# Patient Record
Sex: FEMALE | Race: OTHER | HISPANIC OR LATINO | Employment: FULL TIME | URBAN - METROPOLITAN AREA
[De-identification: names, ages, dates, MRNs, and addresses within clinical notes are randomized per-mention and may not be internally consistent; named-entity substitution may affect disease eponyms.]

---

## 2019-02-06 ENCOUNTER — HOSPITAL ENCOUNTER (EMERGENCY)
Facility: HOSPITAL | Age: 34
Discharge: HOME | End: 2019-02-07
Attending: EMERGENCY MEDICINE | Admitting: EMERGENCY MEDICINE

## 2019-02-06 DIAGNOSIS — K80.50 BILIARY COLIC: Primary | ICD-10-CM

## 2019-02-06 DIAGNOSIS — N39.0 UTI (URINARY TRACT INFECTION): ICD-10-CM

## 2019-02-06 PROCEDURE — 99284 EMERGENCY DEPT VISIT MOD MDM: CPT

## 2019-02-07 ENCOUNTER — APPOINTMENT (EMERGENCY)
Dept: CT IMAGING | Facility: HOSPITAL | Age: 34
End: 2019-02-07

## 2019-02-07 ENCOUNTER — APPOINTMENT (EMERGENCY)
Dept: ULTRASOUND IMAGING | Facility: HOSPITAL | Age: 34
End: 2019-02-07

## 2019-02-07 VITALS
HEART RATE: 84 BPM | OXYGEN SATURATION: 100 % | DIASTOLIC BLOOD PRESSURE: 79 MMHG | RESPIRATION RATE: 16 BRPM | SYSTOLIC BLOOD PRESSURE: 122 MMHG | HEIGHT: 67 IN | TEMPERATURE: 97.2 F | BODY MASS INDEX: 29.86 KG/M2 | WEIGHT: 190.26 LBS

## 2019-02-07 LAB
ALBUMIN SERPL BCP-MCNC: 4.4 G/DL (ref 3–5.2)
ALP SERPL-CCNC: 83 U/L (ref 43–122)
ALT SERPL W P-5'-P-CCNC: 26 U/L (ref 9–52)
AMPHETAMINES SERPL QL SCN: NEGATIVE
ANION GAP SERPL CALCULATED.3IONS-SCNC: 10 MMOL/L (ref 5–14)
AST SERPL W P-5'-P-CCNC: 43 U/L (ref 14–36)
BACTERIA UR QL AUTO: ABNORMAL /HPF
BARBITURATES UR QL: NEGATIVE
BASOPHILS # BLD AUTO: 0.1 THOUSANDS/ΜL (ref 0–0.1)
BASOPHILS NFR BLD AUTO: 1 % (ref 0–1)
BENZODIAZ UR QL: NEGATIVE
BILIRUB SERPL-MCNC: 0.8 MG/DL
BILIRUB UR QL STRIP: NEGATIVE
BUN SERPL-MCNC: 11 MG/DL (ref 5–25)
CALCIUM SERPL-MCNC: 9.3 MG/DL (ref 8.4–10.2)
CHLORIDE SERPL-SCNC: 106 MMOL/L (ref 97–108)
CLARITY UR: ABNORMAL
CO2 SERPL-SCNC: 23 MMOL/L (ref 22–30)
COCAINE UR QL: NEGATIVE
COLOR UR: YELLOW
CREAT SERPL-MCNC: 0.57 MG/DL (ref 0.6–1.2)
EOSINOPHIL # BLD AUTO: 0 THOUSAND/ΜL (ref 0–0.4)
EOSINOPHIL NFR BLD AUTO: 0 % (ref 0–6)
ERYTHROCYTE [DISTWIDTH] IN BLOOD BY AUTOMATED COUNT: 14.4 %
EXT PREG TEST URINE: NEGATIVE
GFR SERPL CREATININE-BSD FRML MDRD: 123 ML/MIN/1.73SQ M
GLUCOSE SERPL-MCNC: 101 MG/DL (ref 70–99)
GLUCOSE UR STRIP-MCNC: NEGATIVE MG/DL
HCT VFR BLD AUTO: 45.4 % (ref 36–46)
HGB BLD-MCNC: 14.5 G/DL (ref 12–16)
HGB UR QL STRIP.AUTO: NEGATIVE
KETONES UR STRIP-MCNC: NEGATIVE MG/DL
LEUKOCYTE ESTERASE UR QL STRIP: 25
LIPASE SERPL-CCNC: 32 U/L (ref 23–300)
LYMPHOCYTES # BLD AUTO: 1 THOUSANDS/ΜL (ref 0.5–4)
LYMPHOCYTES NFR BLD AUTO: 9 % (ref 25–45)
MCH RBC QN AUTO: 30.2 PG (ref 26–34)
MCHC RBC AUTO-ENTMCNC: 32 G/DL (ref 31–36)
MCV RBC AUTO: 94 FL (ref 80–100)
METHADONE UR QL: NEGATIVE
MONOCYTES # BLD AUTO: 0.3 THOUSAND/ΜL (ref 0.2–0.9)
MONOCYTES NFR BLD AUTO: 3 % (ref 1–10)
NEUTROPHILS # BLD AUTO: 9 THOUSANDS/ΜL (ref 1.8–7.8)
NEUTS SEG NFR BLD AUTO: 86 % (ref 45–65)
NITRITE UR QL STRIP: POSITIVE
NON-SQ EPI CELLS URNS QL MICRO: ABNORMAL /HPF
OPIATES UR QL SCN: NEGATIVE
PCP UR QL: NEGATIVE
PH UR STRIP.AUTO: 6.5 [PH] (ref 4.5–8)
PLATELET # BLD AUTO: 307 THOUSANDS/UL (ref 150–450)
PMV BLD AUTO: 9.1 FL (ref 8.9–12.7)
POTASSIUM SERPL-SCNC: 4.1 MMOL/L (ref 3.6–5)
PROT SERPL-MCNC: 7.8 G/DL (ref 5.9–8.4)
PROT UR STRIP-MCNC: NEGATIVE MG/DL
RBC # BLD AUTO: 4.81 MILLION/UL (ref 4–5.2)
RBC #/AREA URNS AUTO: ABNORMAL /HPF
SODIUM SERPL-SCNC: 139 MMOL/L (ref 137–147)
SP GR UR STRIP.AUTO: 1.02 (ref 1–1.04)
THC UR QL: NEGATIVE
UROBILINOGEN UA: NEGATIVE MG/DL
WBC # BLD AUTO: 10.4 THOUSAND/UL (ref 4.5–11)
WBC #/AREA URNS AUTO: ABNORMAL /HPF

## 2019-02-07 PROCEDURE — 80307 DRUG TEST PRSMV CHEM ANLYZR: CPT | Performed by: EMERGENCY MEDICINE

## 2019-02-07 PROCEDURE — 80053 COMPREHEN METABOLIC PANEL: CPT | Performed by: EMERGENCY MEDICINE

## 2019-02-07 PROCEDURE — 76705 ECHO EXAM OF ABDOMEN: CPT

## 2019-02-07 PROCEDURE — 96374 THER/PROPH/DIAG INJ IV PUSH: CPT

## 2019-02-07 PROCEDURE — 96372 THER/PROPH/DIAG INJ SC/IM: CPT

## 2019-02-07 PROCEDURE — 83690 ASSAY OF LIPASE: CPT | Performed by: EMERGENCY MEDICINE

## 2019-02-07 PROCEDURE — 36415 COLL VENOUS BLD VENIPUNCTURE: CPT | Performed by: EMERGENCY MEDICINE

## 2019-02-07 PROCEDURE — 81025 URINE PREGNANCY TEST: CPT | Performed by: EMERGENCY MEDICINE

## 2019-02-07 PROCEDURE — 85025 COMPLETE CBC W/AUTO DIFF WBC: CPT | Performed by: EMERGENCY MEDICINE

## 2019-02-07 PROCEDURE — 81001 URINALYSIS AUTO W/SCOPE: CPT | Performed by: EMERGENCY MEDICINE

## 2019-02-07 PROCEDURE — 74176 CT ABD & PELVIS W/O CONTRAST: CPT

## 2019-02-07 RX ORDER — MORPHINE SULFATE 4 MG/ML
4 INJECTION, SOLUTION INTRAMUSCULAR; INTRAVENOUS ONCE
Status: COMPLETED | OUTPATIENT
Start: 2019-02-07 | End: 2019-02-07

## 2019-02-07 RX ORDER — HYDROMORPHONE HCL/PF 1 MG/ML
SYRINGE (ML) INJECTION
Status: COMPLETED
Start: 2019-02-07 | End: 2019-02-07

## 2019-02-07 RX ORDER — CEPHALEXIN 500 MG/1
500 CAPSULE ORAL EVERY 12 HOURS SCHEDULED
Qty: 1 CAPSULE | Refills: 0 | Status: SHIPPED | OUTPATIENT
Start: 2019-02-07 | End: 2019-02-14

## 2019-02-07 RX ORDER — CEPHALEXIN 500 MG/1
500 CAPSULE ORAL ONCE
Status: COMPLETED | OUTPATIENT
Start: 2019-02-07 | End: 2019-02-07

## 2019-02-07 RX ORDER — ONDANSETRON 4 MG/1
4 TABLET, ORALLY DISINTEGRATING ORAL ONCE
Status: COMPLETED | OUTPATIENT
Start: 2019-02-07 | End: 2019-02-07

## 2019-02-07 RX ORDER — OXYCODONE HYDROCHLORIDE AND ACETAMINOPHEN 5; 325 MG/1; MG/1
1 TABLET ORAL EVERY 4 HOURS PRN
Qty: 10 TABLET | Refills: 0 | Status: SHIPPED | OUTPATIENT
Start: 2019-02-07 | End: 2019-02-17

## 2019-02-07 RX ORDER — HYDROMORPHONE HCL/PF 1 MG/ML
0.5 SYRINGE (ML) INJECTION ONCE
Status: COMPLETED | OUTPATIENT
Start: 2019-02-07 | End: 2019-02-07

## 2019-02-07 RX ORDER — ONDANSETRON 4 MG/1
TABLET, ORALLY DISINTEGRATING ORAL
Status: DISCONTINUED
Start: 2019-02-07 | End: 2019-02-07 | Stop reason: HOSPADM

## 2019-02-07 RX ORDER — HYDROMORPHONE HCL/PF 1 MG/ML
1 SYRINGE (ML) INJECTION ONCE
Status: COMPLETED | OUTPATIENT
Start: 2019-02-07 | End: 2019-02-07

## 2019-02-07 RX ORDER — ONDANSETRON 2 MG/ML
4 INJECTION INTRAMUSCULAR; INTRAVENOUS ONCE
Status: DISCONTINUED | OUTPATIENT
Start: 2019-02-07 | End: 2019-02-07 | Stop reason: HOSPADM

## 2019-02-07 RX ADMIN — MORPHINE SULFATE 4 MG: 4 INJECTION INTRAVENOUS at 01:00

## 2019-02-07 RX ADMIN — HYDROMORPHONE HYDROCHLORIDE 1 MG: 1 INJECTION, SOLUTION INTRAMUSCULAR; INTRAVENOUS; SUBCUTANEOUS at 02:44

## 2019-02-07 RX ADMIN — ONDANSETRON 4 MG: 4 TABLET, ORALLY DISINTEGRATING ORAL at 00:59

## 2019-02-07 RX ADMIN — Medication 1 MG: at 02:44

## 2019-02-07 RX ADMIN — HYDROMORPHONE HYDROCHLORIDE 0.5 MG: 1 INJECTION, SOLUTION INTRAMUSCULAR; INTRAVENOUS; SUBCUTANEOUS at 05:10

## 2019-02-07 RX ADMIN — CEPHALEXIN 500 MG: 500 CAPSULE ORAL at 07:20

## 2019-02-07 NOTE — DISCHARGE INSTRUCTIONS
Biliary Colic   WHAT YOU NEED TO KNOW:   Biliary colic is severe pain in your upper abdomen caused by a gallbladder problem  Your gallbladder stores bile, which helps break down the fats that you eat  DISCHARGE INSTRUCTIONS:   Medicines:   · Medicines  can help decrease pain and muscle spasms  You may also need medicine to calm your stomach and stop vomiting  · Take your medicine as directed  Contact your healthcare provider if you think your medicine is not helping or you have side effects  Tell him if you are allergic to any medicine  Keep a list of the medicines, vitamins, and herbs you take  Include the amounts, and when and why you take them  Bring the list or the pill bottles to follow-up visits  Carry your medicine list with you in case of an emergency  Avoid alcohol:  Alcohol can damage your gallbladder and make your symptoms worse  Maintain a healthy weight:  Ask your healthcare provider how much you should weigh  Ask him to help you create a weight loss plan if you are overweight  Eat a variety of healthy foods:  Healthy foods include fruits, vegetables, whole-grain breads, low-fat dairy products, beans, lean meats, and fish  Foods that are high in fiber and low in fat and cholesterol may decrease your symptoms  Ask if you need to be on a special diet  Exercise:  Ask your healthcare provider about the best exercise plan for you  Exercise may help improve your symptoms  Follow up with your healthcare provider as directed:  Bring a list of any questions you have so you remember to ask them during your visits  Contact your healthcare provider if:   · You have a fever  · Your pain gets worse, even after you take medicine  · You have nausea or are vomiting  · Your skin or eyes are yellow  · You have questions or concerns about your condition or care  Return to the emergency department if:   · You have severe pain  · You feel like you are going to faint      · You are short of breath  © 2017 [facility] Information is for End User's use only and may not be sold, redistributed or otherwise used for commercial purposes  All illustrations and images included in CareNotes® are the copyrighted property of A [employer] M [employer]  or Charli Raymundo  The above information is an  only  It is not intended as medical advice for individual conditions or treatments  Talk to your doctor, nurse or pharmacist before following any medical regimen to see if it is safe and effective for you  Urinary Tract Infection in Women   AMBULATORY CARE:   A urinary tract infection (UTI)  is caused by bacteria that get inside your urinary tract  Most bacteria that enter your urinary tract come out when you urinate  If the bacteria stay in your urinary tract, you may get an infection  Your urinary tract includes your kidneys, ureters, bladder, and urethra  Urine is made in your kidneys, and it flows from the ureters to the bladder  Urine leaves the bladder through the urethra  A UTI is more common in your lower urinary tract, which includes your bladder and urethra  Common symptoms include the following:   · Urinating more often or waking from sleep to urinate    · Pain or burning when you urinate    · Pain or pressure in your lower abdomen     · Urine that smells bad    · Blood in your urine    · Leaking urine  Seek care immediately if:   · You are urinating very little or not at all  · You have a high fever with shaking chills  · You have side or back pain that gets worse  Contact your healthcare provider if:   · You have a fever  · You do not feel better after 2 days of taking antibiotics  · You are vomiting  · You have questions or concerns about your condition or care  Treatment for a UTI  may include medicines to treat a bacterial infection  You may also need medicines to decrease pain and burning, or decrease the urge to urinate often    Prevent a UTI:   · Empty your bladder often  Urinate and empty your bladder as soon as you feel the need  Do not hold your urine for long periods of time  · Wipe from front to back after you urinate or have a bowel movement  This will help prevent germs from getting into your urinary tract through your urethra  · Drink liquids as directed  Ask how much liquid to drink each day and which liquids are best for you  You may need to drink more liquids than usual to help flush out the bacteria  Do not drink alcohol, caffeine, or citrus juices  These can irritate your bladder and increase your symptoms  Your healthcare provider may recommend cranberry juice to help prevent a UTI  · Urinate after you have sex  This can help flush out bacteria passed during sex  · Do not douche or use feminine deodorants  These can change the chemical balance in your vagina  · Change sanitary pads or tampons often  This will help prevent germs from getting into your urinary tract  · Do pelvic muscle exercises often  Pelvic muscle exercises may help you start and stop urinating  Strong pelvic muscles may help you empty your bladder easier  Squeeze these muscles tightly for 5 seconds like you are trying to hold back urine  Then relax for 5 seconds  Gradually work up to squeezing for 10 seconds  Do 3 sets of 15 repetitions a day, or as directed  Follow up with your healthcare provider as directed:  Write down your questions so you remember to ask them during your visits  © 2017 [address] [facility] [facility] Information is for End User's use only and may not be sold, redistributed or otherwise used for commercial purposes  All illustrations and images included in CareNotes® are the copyrighted property of A D A M [employer] Inc  or Charli Raymundo  The above information is an  only  It is not intended as medical advice for individual conditions or treatments   Talk to your doctor, nurse or pharmacist before following any medical regimen to see if it is safe and effective for you

## 2019-02-07 NOTE — ED RE-EVALUATION NOTE
Up ambulating  Ultrasound has been ordered    Signed out to the day shift physician     Lázaro Panchal DO  02/07/19 0957

## 2019-02-07 NOTE — ED PROVIDER NOTES
History  Chief Complaint   Patient presents with    Abdominal Pain     "I have abdominal pain that started tonight at 1700 hours "  Patient did not take anything for the pain  This is a pleasant 29-year-old female that presents with upper abdominal pain  She states that pain began around 5:00 p m , and has been present intermittently since  Patient states that she vomited twice but does not report nausea  She took 1 Tums with minimal relief  She denies any lower abdominal pain  She states that her last menstrual period was January 27th  She denies any vaginal bleeding or discharge  Her only medication is albuterol  She reports no chest pain or shortness of breath  Patient does not smoke, drink alcohol or do illicit drugs  None       No past medical history on file  No past surgical history on file  No family history on file  I have reviewed and agree with the history as documented  Social History   Substance Use Topics    Smoking status: Not on file    Smokeless tobacco: Not on file    Alcohol use Not on file        Review of Systems   Constitutional: Negative  HENT: Negative  Eyes: Negative  Respiratory: Negative  Cardiovascular: Negative  Gastrointestinal: Positive for abdominal distention, abdominal pain and vomiting  Negative for constipation, diarrhea and nausea  Endocrine: Negative  Genitourinary: Negative  Negative for difficulty urinating, pelvic pain, urgency, vaginal bleeding, vaginal discharge and vaginal pain  Musculoskeletal: Negative  Skin: Negative  Allergic/Immunologic: Negative  Neurological: Negative  Hematological: Negative  Psychiatric/Behavioral: Negative  All other systems reviewed and are negative  Physical Exam  Physical Exam   Constitutional: She is oriented to person, place, and time  Vital signs are normal  She appears well-developed and well-nourished  She is active  Non-toxic appearance   She does not have a sickly appearance  She does not appear ill  No distress  HENT:   Head: Normocephalic and atraumatic  Eyes: Pupils are equal, round, and reactive to light  EOM and lids are normal    Neck: Trachea normal and normal range of motion  Normal carotid pulses present  Carotid bruit is not present  Cardiovascular: Normal rate, regular rhythm, normal heart sounds and intact distal pulses  Exam reveals no gallop and no friction rub  No murmur heard  Pulmonary/Chest: Effort normal and breath sounds normal  No respiratory distress  Abdominal: Soft  Normal appearance and bowel sounds are normal  She exhibits no ascites  There is no hepatosplenomegaly  There is tenderness in the epigastric area  There is no rigidity, no rebound, no guarding, no CVA tenderness, no tenderness at McBurney's point and negative Helton's sign  Musculoskeletal: Normal range of motion  She exhibits no edema, tenderness or deformity  Neurological: She is alert and oriented to person, place, and time  Skin: Skin is warm, dry and intact  She is not diaphoretic  No pallor  Psychiatric: She has a normal mood and affect  Her behavior is normal  Judgment and thought content normal  Her mood appears not anxious  Her affect is not blunt  Vitals reviewed        Vital Signs  ED Triage Vitals [02/06/19 2353]   Temperature Pulse Respirations Blood Pressure SpO2   (!) 97 2 °F (36 2 °C) 86 18 126/83 98 %      Temp Source Heart Rate Source Patient Position - Orthostatic VS BP Location FiO2 (%)   Tympanic Monitor Sitting Left arm --      Pain Score       Worst Possible Pain           Vitals:    02/06/19 2353   BP: 126/83   Pulse: 86   Patient Position - Orthostatic VS: Sitting       Visual Acuity      ED Medications  Medications - No data to display    Diagnostic Studies  Results Reviewed     Procedure Component Value Units Date/Time    CBC and differential [[id number]]     Lab Status:  No result Specimen:  Blood     Comprehensive metabolic panel [[id number]]     Lab Status:  No result Specimen:  Blood     Lipase [[id number]]     Lab Status:  No result Specimen:  Blood     POCT pregnancy, urine [[id number]]     Lab Status:  No result Specimen:  Urine     UA w Reflex to Microscopic [[id number]]     Lab Status:  No result Specimen:  Urine                  No orders to display              Procedures  Procedures       Phone Contacts  ED Phone Contact    ED Course                               MDM    Disposition  Final diagnoses:   None     ED Disposition     None      Follow-up Information    None         Patient's Medications    No medications on file     No discharge procedures on file      ED Provider  Electronically Signed by           Brandyn Schultz DO  02/07/19 Chandrakant Zepeda DO  02/07/19 4464

## 2019-02-07 NOTE — ED CARE HANDOFF
Emergency Department Sign Out Note        Sign out and transfer of care from Dr Jesus Weiss  See Separate Emergency Department note  The patient, Alla Mercado, was evaluated by the previous provider for abdominal pain  Workup Completed:  PE, CT, labs    ED Course / Workup Pending (followup): U/s      Received sign-out from Dr Jesus Weiss  Patient presents complaint of abdominal pain  At this point time an ultrasound of her right upper quadrant still pending  Laboratory evaluation was unremarkable and patient will likely be discharged home  Ultrasound is resulted and shows gallstones but no sonographic signs of acute cholecystitis  Plan discharge home the instructions provided by Dr Jesus Weiss  She is aware of the importance of very close outpatient follow-up  Procedures  MDM    Disposition  Final diagnoses:   Biliary colic   UTI (urinary tract infection)     Time reflects when diagnosis was documented in both MDM as applicable and the Disposition within this note     Time User Action Codes Description Comment    2/7/2019  4:06 AM Henny Shah Add [Y90 88] Biliary colic     2/7/4855  8:80 AM Henny Shah Add [N39 0] UTI (urinary tract infection)       ED Disposition     ED Disposition Condition Date/Time Comment    Discharge  Thu Feb 7, 2019  9:21 AM Alla Mercado discharge to home/self care      Condition at discharge: Good        Follow-up Information     Follow up With Specialties Details Why Contact Info    [address] [facility]   If symptoms worsen [address]  [phone]    [address] [facility]  Schedule an appointment as soon as possible for a visit in 1 day If symptoms worsen [address]  [phone]        Patient's Medications   Discharge Prescriptions    CEPHALEXIN (KEFLEX) 500 MG CAPSULE Take 1 capsule (500 mg total) by mouth every 12 (twelve) hours for 14 doses       Start Date: 2/7/2019  End Date: 2/14/2019       Order Dose: 500 mg       Quantity: 1 capsule    Refills: 0     No discharge procedures on file         ED Provider  Electronically Signed by     Gillian Cabrera DO  02/07/19 5055

## 2019-02-07 NOTE — ED RE-EVALUATION NOTE
Discuss lab work and CT scan findings  Patient still has right upper quadrant abdominal pain  Negative Helton sign  Patient willing to wait for ultrasonography       [address],   02/07/19 8198

## 2019-11-15 ENCOUNTER — HOSPITAL ENCOUNTER (EMERGENCY)
Facility: HOSPITAL | Age: 34
Discharge: HOME | End: 2019-11-15
Attending: EMERGENCY MEDICINE

## 2019-11-15 VITALS
RESPIRATION RATE: 20 BRPM | BODY MASS INDEX: 31.04 KG/M2 | SYSTOLIC BLOOD PRESSURE: 107 MMHG | WEIGHT: 198.19 LBS | DIASTOLIC BLOOD PRESSURE: 71 MMHG | TEMPERATURE: 96.8 F | HEART RATE: 83 BPM | OXYGEN SATURATION: 98 %

## 2019-11-15 DIAGNOSIS — J45.909 ASTHMA: ICD-10-CM

## 2019-11-15 DIAGNOSIS — J06.9 URI (UPPER RESPIRATORY INFECTION): Primary | ICD-10-CM

## 2019-11-15 PROCEDURE — 94640 AIRWAY INHALATION TREATMENT: CPT

## 2019-11-15 PROCEDURE — 99284 EMERGENCY DEPT VISIT MOD MDM: CPT

## 2019-11-15 PROCEDURE — 99284 EMERGENCY DEPT VISIT MOD MDM: CPT | Performed by: EMERGENCY MEDICINE

## 2019-11-15 RX ORDER — IPRATROPIUM BROMIDE AND ALBUTEROL SULFATE 2.5; .5 MG/3ML; MG/3ML
3 SOLUTION RESPIRATORY (INHALATION) ONCE
Status: COMPLETED | OUTPATIENT
Start: 2019-11-15 | End: 2019-11-15

## 2019-11-15 RX ORDER — ALBUTEROL SULFATE 90 UG/1
2 AEROSOL, METERED RESPIRATORY (INHALATION) ONCE
Status: COMPLETED | OUTPATIENT
Start: 2019-11-15 | End: 2019-11-15

## 2019-11-15 RX ORDER — BENZONATATE 100 MG/1
100 CAPSULE ORAL EVERY 8 HOURS
Qty: 21 CAPSULE | Refills: 0 | Status: SHIPPED | OUTPATIENT
Start: 2019-11-15

## 2019-11-15 RX ORDER — GUAIFENESIN 600 MG
1200 TABLET, EXTENDED RELEASE 12 HR ORAL EVERY 12 HOURS SCHEDULED
Qty: 30 TABLET | Refills: 0 | Status: SHIPPED | OUTPATIENT
Start: 2019-11-15

## 2019-11-15 RX ORDER — ALBUTEROL SULFATE 90 UG/1
2 AEROSOL, METERED RESPIRATORY (INHALATION) EVERY 4 HOURS PRN
Qty: 1 INHALER | Refills: 0 | Status: SHIPPED | OUTPATIENT
Start: 2019-11-15 | End: 2021-10-26 | Stop reason: SDUPTHER

## 2019-11-15 RX ORDER — FLUTICASONE PROPIONATE 50 MCG
1 SPRAY, SUSPENSION (ML) NASAL DAILY
Qty: 16 G | Refills: 0 | Status: SHIPPED | OUTPATIENT
Start: 2019-11-15

## 2019-11-15 RX ADMIN — IPRATROPIUM BROMIDE AND ALBUTEROL SULFATE 3 ML: 2.5; .5 SOLUTION RESPIRATORY (INHALATION) at 18:39

## 2019-11-15 RX ADMIN — ALBUTEROL SULFATE 2 PUFF: 90 AEROSOL, METERED RESPIRATORY (INHALATION) at 19:04

## 2019-11-15 NOTE — ED PROVIDER NOTES
History  Chief Complaint   Patient presents with    Medication Refill     Patient has run out of her nebulizer treatments and her rescue inhaler   Shortness of Breath     Patient is experincing SOB since this morning  28-year-old female presents with URI symptoms  She has allergies as well and is unable to determine which is causing most of her symptoms at this point time  Her asthma has been flaring up secondary to her increased nasal congestion  She ran out of her nebulizer treatment ampules and does not currently have an inhaler  She denies any GI symptoms or other acute issues or concerns at this point in time  Asthma   Severity:  Moderate  Onset quality:  Gradual  Timing:  Constant  Progression:  Waxing and waning  Chronicity:  Recurrent  Relieved by:  Nebs  Worsened by: Allergies  Associated symptoms: congestion, cough, fatigue, rhinorrhea, shortness of breath and wheezing    Associated symptoms: no abdominal pain, no chest pain, no diarrhea, no ear pain, no fever, no headaches, no myalgias, no nausea, no rash, no sore throat and no vomiting        None       History reviewed  No pertinent past medical history  History reviewed  No pertinent surgical history  History reviewed  No pertinent family history  I have reviewed and agree with the history as documented  Social History     Tobacco Use    Smoking status: Never Smoker    Smokeless tobacco: Never Used   Substance Use Topics    Alcohol use: Not on file    Drug use: Not on file        Review of Systems   Constitutional: Positive for fatigue  Negative for appetite change, chills and fever  HENT: Positive for congestion and rhinorrhea  Negative for ear pain, postnasal drip, sinus pain, sore throat and trouble swallowing  Eyes: Negative for redness and itching  Respiratory: Positive for cough, shortness of breath and wheezing  Negative for chest tightness  Cardiovascular: Negative for chest pain and leg swelling  Gastrointestinal: Negative for abdominal pain, constipation, diarrhea, nausea and vomiting  Endocrine: Negative  Genitourinary: Negative for difficulty urinating and dysuria  Musculoskeletal: Negative for back pain and myalgias  Skin: Negative for rash  Allergic/Immunologic: Negative  Neurological: Negative for dizziness, numbness and headaches  Hematological: Negative  Psychiatric/Behavioral: Negative  Physical Exam  Physical Exam   Constitutional: She is oriented to person, place, and time  She appears well-developed and well-nourished  HENT:   Head: Normocephalic and atraumatic  Nose: Mucosal edema and rhinorrhea present  Mouth/Throat: Uvula is midline and mucous membranes are normal  Posterior oropharyngeal erythema (mild) present  No oropharyngeal exudate, posterior oropharyngeal edema or tonsillar abscesses  No tonsillar exudate  Eyes: Pupils are equal, round, and reactive to light  Conjunctivae and EOM are normal    Neck: Normal range of motion  Neck supple  Cardiovascular: Normal rate, regular rhythm and normal heart sounds  Pulmonary/Chest: Effort normal and breath sounds normal  No respiratory distress  She has no wheezes  She has no rhonchi  She has no rales  Abdominal: Soft  Bowel sounds are normal  There is no tenderness  There is no guarding  Musculoskeletal: She exhibits no edema, tenderness or deformity  Neurological: She is alert and oriented to person, place, and time  Skin: Skin is warm and dry  Capillary refill takes less than 2 seconds  No rash noted  Psychiatric: She has a normal mood and affect  Her behavior is normal    Nursing note and vitals reviewed        Vital Signs  ED Triage Vitals [11/15/19 1817]   Temperature Pulse Respirations Blood Pressure SpO2   (!) 96 8 °F (36 °C) 83 20 107/71 98 %      Temp Source Heart Rate Source Patient Position - Orthostatic VS BP Location FiO2 (%)   Tympanic Monitor Sitting Left arm --      Pain Score --           Vitals:    11/15/19 1817   BP: 107/71   Pulse: 83   Patient Position - Orthostatic VS: Sitting         Visual Acuity      ED Medications  Medications   ipratropium-albuterol (DUO-NEB) 0 5-2 5 mg/3 mL inhalation solution 3 mL (3 mL Nebulization Given 11/15/19 1839)   albuterol (PROVENTIL HFA,VENTOLIN HFA) inhaler 2 puff (2 puffs Inhalation Given 11/15/19 1904)       Diagnostic Studies  Results Reviewed     None                 No orders to display              Procedures  Procedures       ED Course                               MDM  Number of Diagnoses or Management Options  Asthma:   URI (upper respiratory infection):   Diagnosis management comments: 62-year-old female presents with URI symptoms along with an asthma exacerbation  She has run out of her medications  She denies any fevers/chills, GI symptoms, or other acute issues or concerns  Disposition  Final diagnoses:   URI (upper respiratory infection)   Asthma     Time reflects when diagnosis was documented in both MDM as applicable and the Disposition within this note     Time User Action Codes Description Comment    11/15/2019  6:45 PM Julious Duane Add [J06 9] URI (upper respiratory infection)     11/15/2019  6:45 PM Margot Silva, [id number] Terry Krishnan [[id number]] Asthma       ED Disposition     ED Disposition Condition Date/Time Comment    Discharge Stable Fri Nov 15, 2019  6:45 PM Synetta Card discharge to home/self care              Follow-up Information    None         Discharge Medication List as of 11/15/2019  6:48 PM      START taking these medications    Details   albuterol (5 mg/mL) 0 5 % nebulizer solution Take 0 5 mL (2 5 mg total) by nebulization every 6 (six) hours as needed for wheezing, Starting Fri 11/15/2019, Print      albuterol (PROVENTIL HFA,VENTOLIN HFA) 90 mcg/act inhaler Inhale 2 puffs every 4 (four) hours as needed for wheezing, Starting Fri 11/15/2019, Print      benzonatate (TESSALON PERLES) 100 mg capsule Take 1 capsule (100 mg total) by mouth every 8 (eight) hours, Starting Fri 11/15/2019, Print      fluticasone (FLONASE) 50 mcg/act nasal spray 1 spray into each nostril daily, Starting Fri 11/15/2019, Print      guaiFENesin (MUCINEX) 600 mg 12 hr tablet Take 2 tablets (1,200 mg total) by mouth every 12 (twelve) hours, Starting Fri 11/15/2019, Print           No discharge procedures on file      ED Provider  Electronically Signed by           Kolton Field DO  11/15/19 1930

## 2021-05-17 ENCOUNTER — HOSPITAL ENCOUNTER (EMERGENCY)
Facility: HOSPITAL | Age: 36
Discharge: HOME | End: 2021-05-17
Attending: EMERGENCY MEDICINE | Admitting: EMERGENCY MEDICINE
Payer: COMMERCIAL

## 2021-05-17 VITALS
RESPIRATION RATE: 16 BRPM | DIASTOLIC BLOOD PRESSURE: 66 MMHG | TEMPERATURE: 98 F | SYSTOLIC BLOOD PRESSURE: 156 MMHG | HEART RATE: 80 BPM | OXYGEN SATURATION: 100 % | WEIGHT: 196.4 LBS | BODY MASS INDEX: 30.76 KG/M2

## 2021-05-17 DIAGNOSIS — K08.89 PAIN, DENTAL: Primary | ICD-10-CM

## 2021-05-17 PROCEDURE — 99282 EMERGENCY DEPT VISIT SF MDM: CPT

## 2021-05-17 PROCEDURE — 99284 EMERGENCY DEPT VISIT MOD MDM: CPT | Performed by: EMERGENCY MEDICINE

## 2021-05-17 RX ORDER — TRAMADOL HYDROCHLORIDE 50 MG/1
50 TABLET ORAL EVERY 6 HOURS PRN
Qty: 14 TABLET | Refills: 0 | Status: SHIPPED | OUTPATIENT
Start: 2021-05-17 | End: 2021-05-27

## 2021-05-17 NOTE — ED PROVIDER NOTES
History  Chief Complaint   Patient presents with    Dental Pain     3 weeks with wisdom tooth pain  Patient is a 30-year-old female complaining of a 3 week history of right lower left lower and right upper toothache  States has molars that have come out  Cannot be seen by her dentist until next week  Has been taking Tylenol without relief  Constant sharp pain that radiates to the ear  Worse with any movement better with rest   Denies any fever swelling or difficulty swallowing  Prior to Admission Medications   Prescriptions Last Dose Informant Patient Reported? Taking? albuterol (5 mg/mL) 0 5 % nebulizer solution   No No   Sig: Take 0 5 mL (2 5 mg total) by nebulization every 6 (six) hours as needed for wheezing   albuterol (PROVENTIL HFA,VENTOLIN HFA) 90 mcg/act inhaler   No No   Sig: Inhale 2 puffs every 4 (four) hours as needed for wheezing   benzonatate (TESSALON PERLES) 100 mg capsule   No No   Sig: Take 1 capsule (100 mg total) by mouth every 8 (eight) hours   fluticasone (FLONASE) 50 mcg/act nasal spray   No No   Si spray into each nostril daily   guaiFENesin (MUCINEX) 600 mg 12 hr tablet   No No   Sig: Take 2 tablets (1,200 mg total) by mouth every 12 (twelve) hours      Facility-Administered Medications: None       History reviewed  No pertinent past medical history  History reviewed  No pertinent surgical history  History reviewed  No pertinent family history  I have reviewed and agree with the history as documented  E-Cigarette/Vaping     E-Cigarette/Vaping Substances     Social History     Tobacco Use    Smoking status: Never Smoker    Smokeless tobacco: Never Used   Substance Use Topics    Alcohol use: Not on file    Drug use: Not on file       Review of Systems   Constitutional: Negative  Negative for activity change and fever  HENT: Positive for dental problem  Eyes: Negative  Respiratory: Negative  Negative for shortness of breath      Cardiovascular: Negative  Negative for chest pain  Gastrointestinal: Negative  Endocrine: Negative  Genitourinary: Negative  Musculoskeletal: Negative  Skin: Negative  Allergic/Immunologic: Negative  Neurological: Negative  Hematological: Negative  Psychiatric/Behavioral: Negative  All other systems reviewed and are negative  Physical Exam  Physical Exam  Vitals signs and nursing note reviewed  Constitutional:       Appearance: Normal appearance  She is normal weight  HENT:      Head: Normocephalic and atraumatic  Right Ear: Tympanic membrane, ear canal and external ear normal       Left Ear: Tympanic membrane, ear canal and external ear normal       Nose: Nose normal       Mouth/Throat:      Mouth: Mucous membranes are moist       Comments: Impacted right upper and lower 3rd molar  No fluctuance no abscess no trismus no tongue protrusion no brawny discoloration of the chin  Neck:      Musculoskeletal: Normal range of motion and neck supple  Cardiovascular:      Rate and Rhythm: Normal rate and regular rhythm  Pulses: Normal pulses  Heart sounds: Normal heart sounds  Pulmonary:      Effort: Pulmonary effort is normal       Breath sounds: Normal breath sounds  Musculoskeletal: Normal range of motion  Skin:     General: Skin is warm and dry  Capillary Refill: Capillary refill takes less than 2 seconds  Neurological:      General: No focal deficit present  Mental Status: She is alert and oriented to person, place, and time     Psychiatric:         Mood and Affect: Mood normal          Behavior: Behavior normal          Vital Signs  ED Triage Vitals [05/17/21 1108]   Temperature Pulse Respirations Blood Pressure SpO2   98 °F (36 7 °C) 80 16 156/66 100 %      Temp Source Heart Rate Source Patient Position - Orthostatic VS BP Location FiO2 (%)   Tympanic Monitor Sitting Left arm --      Pain Score       Worst Possible Pain           Vitals:    05/17/21 1108   BP: 156/66   Pulse: 80   Patient Position - Orthostatic VS: Sitting         Visual Acuity      ED Medications  Medications - No data to display    Diagnostic Studies  Results Reviewed     None                 No orders to display              Procedures  Procedures         ED Course                             SBIRT 22yo+      Most Recent Value   SBIRT (22 yo +)   In order to provide better care to our patients, we are screening all of our patients for alcohol and drug use  Would it be okay to ask you these screening questions? No Filed at: 05/17/2021 1125                    MDM    Disposition  Final diagnoses:   Pain, dental     Time reflects when diagnosis was documented in both MDM as applicable and the Disposition within this note     Time User Action Codes Description Comment    5/17/2021 11:20 AM Elissa Beach Add [K08 89] Pain, dental       ED Disposition     ED Disposition Condition Date/Time Comment    Discharge Stable Mon May 17, 2021 11:20 AM Roosevelt Banerjee discharge to home/self care  Follow-up Information     Follow up With Specialties Details Why Contact Info Additional [address] [facility] Emergency Department Emergency Medicine  Please follow up with your dentist as scheduled next week  [address] [facility] [id number] [address] [facility]          Patient's Medications   Discharge Prescriptions    TRAMADOL (ULTRAM) 50 MG TABLET    Take 1 tablet (50 mg total) by mouth every 6 (six) hours as needed for moderate pain for up to 10 days       Start Date: 5/17/2021 End Date: 5/27/2021       Order Dose: 50 mg       Quantity: 14 tablet    Refills: 0     No discharge procedures on file      PDMP Review     None          ED Provider  Electronically Signed by           Joi Perry MD  05/17/21 1126

## 2021-06-07 ENCOUNTER — HOSPITAL ENCOUNTER (EMERGENCY)
Facility: HOSPITAL | Age: 36
Discharge: HOME | End: 2021-06-07
Attending: EMERGENCY MEDICINE | Admitting: EMERGENCY MEDICINE
Payer: COMMERCIAL

## 2021-06-07 ENCOUNTER — APPOINTMENT (EMERGENCY)
Dept: CT IMAGING | Facility: HOSPITAL | Age: 36
End: 2021-06-07
Payer: COMMERCIAL

## 2021-06-07 VITALS
RESPIRATION RATE: 16 BRPM | HEART RATE: 82 BPM | OXYGEN SATURATION: 97 % | SYSTOLIC BLOOD PRESSURE: 115 MMHG | DIASTOLIC BLOOD PRESSURE: 76 MMHG | TEMPERATURE: 99.1 F

## 2021-06-07 DIAGNOSIS — R19.7 DIARRHEA: ICD-10-CM

## 2021-06-07 DIAGNOSIS — A04.72 C. DIFFICILE DIARRHEA: ICD-10-CM

## 2021-06-07 DIAGNOSIS — K52.9 COLITIS: Primary | ICD-10-CM

## 2021-06-07 DIAGNOSIS — R10.9 ABDOMINAL PAIN: ICD-10-CM

## 2021-06-07 DIAGNOSIS — R11.2 NAUSEA AND VOMITING: ICD-10-CM

## 2021-06-07 LAB
ALBUMIN SERPL BCP-MCNC: 3.4 G/DL (ref 3.5–5)
ALP SERPL-CCNC: 79 U/L (ref 46–116)
ALT SERPL W P-5'-P-CCNC: 50 U/L (ref 12–78)
ANION GAP SERPL CALCULATED.3IONS-SCNC: 11 MMOL/L (ref 4–13)
AST SERPL W P-5'-P-CCNC: 30 U/L (ref 5–45)
BASOPHILS # BLD AUTO: 0.04 THOUSANDS/ΜL (ref 0–0.1)
BASOPHILS NFR BLD AUTO: 0 % (ref 0–1)
BILIRUB SERPL-MCNC: 0.54 MG/DL (ref 0.2–1)
BILIRUB UR QL STRIP: ABNORMAL
BUN SERPL-MCNC: 9 MG/DL (ref 5–25)
CALCIUM ALBUM COR SERPL-MCNC: 9.5 MG/DL (ref 8.3–10.1)
CALCIUM SERPL-MCNC: 9 MG/DL (ref 8.3–10.1)
CHLORIDE SERPL-SCNC: 107 MMOL/L (ref 100–108)
CLARITY UR: CLEAR
CO2 SERPL-SCNC: 25 MMOL/L (ref 21–32)
COLOR UR: YELLOW
CREAT SERPL-MCNC: 0.66 MG/DL (ref 0.6–1.3)
EOSINOPHIL # BLD AUTO: 0.06 THOUSAND/ΜL (ref 0–0.61)
EOSINOPHIL NFR BLD AUTO: 0 % (ref 0–6)
ERYTHROCYTE [DISTWIDTH] IN BLOOD BY AUTOMATED COUNT: 14.2 % (ref 11.6–15.1)
EXT PREG TEST URINE: NEGATIVE
EXT. CONTROL ED NAV: NORMAL
GFR SERPL CREATININE-BSD FRML MDRD: 116 ML/MIN/1.73SQ M
GLUCOSE SERPL-MCNC: 92 MG/DL (ref 65–140)
GLUCOSE UR STRIP-MCNC: NEGATIVE MG/DL
HCT VFR BLD AUTO: 47.7 % (ref 34.8–46.1)
HGB BLD-MCNC: 15 G/DL (ref 11.5–15.4)
HGB UR QL STRIP.AUTO: NEGATIVE
IMM GRANULOCYTES # BLD AUTO: 0.08 THOUSAND/UL (ref 0–0.2)
IMM GRANULOCYTES NFR BLD AUTO: 1 % (ref 0–2)
KETONES UR STRIP-MCNC: ABNORMAL MG/DL
LEUKOCYTE ESTERASE UR QL STRIP: NEGATIVE
LIPASE SERPL-CCNC: 30 U/L (ref 73–393)
LYMPHOCYTES # BLD AUTO: 1 THOUSANDS/ΜL (ref 0.6–4.47)
LYMPHOCYTES NFR BLD AUTO: 7 % (ref 14–44)
MCH RBC QN AUTO: 31.1 PG (ref 26.8–34.3)
MCHC RBC AUTO-ENTMCNC: 31.4 G/DL (ref 31.4–37.4)
MCV RBC AUTO: 99 FL (ref 82–98)
MONOCYTES # BLD AUTO: 1.1 THOUSAND/ΜL (ref 0.17–1.22)
MONOCYTES NFR BLD AUTO: 8 % (ref 4–12)
NEUTROPHILS # BLD AUTO: 12.3 THOUSANDS/ΜL (ref 1.85–7.62)
NEUTS SEG NFR BLD AUTO: 84 % (ref 43–75)
NITRITE UR QL STRIP: NEGATIVE
NRBC BLD AUTO-RTO: 0 /100 WBCS
PH UR STRIP.AUTO: 5.5 [PH] (ref 4.5–8)
PLATELET # BLD AUTO: 329 THOUSANDS/UL (ref 149–390)
PMV BLD AUTO: 10.8 FL (ref 8.9–12.7)
POTASSIUM SERPL-SCNC: 3.9 MMOL/L (ref 3.5–5.3)
PROT SERPL-MCNC: 7 G/DL (ref 6.4–8.2)
PROT UR STRIP-MCNC: NEGATIVE MG/DL
RBC # BLD AUTO: 4.82 MILLION/UL (ref 3.81–5.12)
SODIUM SERPL-SCNC: 143 MMOL/L (ref 136–145)
SP GR UR STRIP.AUTO: >=1.03 (ref 1–1.03)
UROBILINOGEN UR QL STRIP.AUTO: 0.2 E.U./DL
WBC # BLD AUTO: 14.58 THOUSAND/UL (ref 4.31–10.16)

## 2021-06-07 PROCEDURE — 74177 CT ABD & PELVIS W/CONTRAST: CPT

## 2021-06-07 PROCEDURE — 85025 COMPLETE CBC W/AUTO DIFF WBC: CPT

## 2021-06-07 PROCEDURE — 96375 TX/PRO/DX INJ NEW DRUG ADDON: CPT

## 2021-06-07 PROCEDURE — 99284 EMERGENCY DEPT VISIT MOD MDM: CPT

## 2021-06-07 PROCEDURE — 99285 EMERGENCY DEPT VISIT HI MDM: CPT | Performed by: PHYSICIAN ASSISTANT

## 2021-06-07 PROCEDURE — 96374 THER/PROPH/DIAG INJ IV PUSH: CPT

## 2021-06-07 PROCEDURE — 87505 NFCT AGENT DETECTION GI: CPT | Performed by: PHYSICIAN ASSISTANT

## 2021-06-07 PROCEDURE — 80053 COMPREHEN METABOLIC PANEL: CPT

## 2021-06-07 PROCEDURE — 83690 ASSAY OF LIPASE: CPT

## 2021-06-07 PROCEDURE — 36415 COLL VENOUS BLD VENIPUNCTURE: CPT

## 2021-06-07 PROCEDURE — 81025 URINE PREGNANCY TEST: CPT

## 2021-06-07 PROCEDURE — 96361 HYDRATE IV INFUSION ADD-ON: CPT

## 2021-06-07 PROCEDURE — NC001 PR NO CHARGE: Performed by: PHYSICIAN ASSISTANT

## 2021-06-07 PROCEDURE — 81003 URINALYSIS AUTO W/O SCOPE: CPT

## 2021-06-07 RX ORDER — DICYCLOMINE HCL 20 MG
20 TABLET ORAL 2 TIMES DAILY
Qty: 20 TABLET | Refills: 0 | Status: SHIPPED | OUTPATIENT
Start: 2021-06-07 | End: 2022-08-10 | Stop reason: ALTCHOICE

## 2021-06-07 RX ORDER — ONDANSETRON 4 MG/1
4 TABLET, ORALLY DISINTEGRATING ORAL EVERY 6 HOURS PRN
Qty: 20 TABLET | Refills: 0 | Status: SHIPPED | OUTPATIENT
Start: 2021-06-07 | End: 2022-08-10 | Stop reason: ALTCHOICE

## 2021-06-07 RX ORDER — MORPHINE SULFATE 4 MG/ML
4 INJECTION, SOLUTION INTRAMUSCULAR; INTRAVENOUS ONCE
Status: COMPLETED | OUTPATIENT
Start: 2021-06-07 | End: 2021-06-07

## 2021-06-07 RX ORDER — DICYCLOMINE HCL 20 MG
20 TABLET ORAL ONCE
Status: COMPLETED | OUTPATIENT
Start: 2021-06-07 | End: 2021-06-07

## 2021-06-07 RX ORDER — ONDANSETRON 2 MG/ML
4 INJECTION INTRAMUSCULAR; INTRAVENOUS ONCE
Status: COMPLETED | OUTPATIENT
Start: 2021-06-07 | End: 2021-06-07

## 2021-06-07 RX ADMIN — MORPHINE SULFATE 4 MG: 4 INJECTION INTRAVENOUS at 13:42

## 2021-06-07 RX ADMIN — SODIUM CHLORIDE 1000 ML: 0.9 INJECTION, SOLUTION INTRAVENOUS at 12:19

## 2021-06-07 RX ADMIN — DICYCLOMINE HYDROCHLORIDE 20 MG: 20 TABLET ORAL at 12:20

## 2021-06-07 RX ADMIN — ONDANSETRON 4 MG: 2 INJECTION INTRAMUSCULAR; INTRAVENOUS at 12:19

## 2021-06-07 RX ADMIN — IOHEXOL 100 ML: 350 INJECTION, SOLUTION INTRAVENOUS at 13:25

## 2021-06-07 NOTE — DISCHARGE INSTRUCTIONS
ABDOMEN     LOWER CHEST:  No clinically significant abnormality identified in the visualized lower chest      LIVER/BILIARY TREE:  Unremarkable  GALLBLADDER:  There are gallstone(s) within the gallbladder, without pericholecystic inflammatory changes  SPLEEN:  Unremarkable  PANCREAS:  Unremarkable  ADRENAL GLANDS:  Unremarkable  KIDNEYS/URETERS:  Unremarkable  No hydronephrosis  STOMACH AND BOWEL:  Diffuse colonic thickening, severe in degree through the cecum and ascending colon in keeping with a nonspecific colitis  C  difficile colitis is part of the differential diagnosis  APPENDIX:  No findings to suggest appendicitis  ABDOMINOPELVIC CAVITY:  No ascites  No pneumoperitoneum  No lymphadenopathy  VESSELS:  Unremarkable for patient's age  PELVIS     REPRODUCTIVE ORGANS:  Unremarkable for patient's age  URINARY BLADDER:  Decompressed  ABDOMINAL WALL/INGUINAL REGIONS:  Areas of subcutaneous anterior and flank edema in keeping with the patient's surgical history  No collections  OSSEOUS STRUCTURES:  No acute fracture or destructive osseous lesion  IMPRESSION:     Diffuse colonic thickening, most prominent and severe in degree through the cecum and ascending colon in keeping with a nonspecific pancolitis  C  difficile colitis is part of the differential diagnosis

## 2021-06-07 NOTE — ED PROVIDER NOTES
History  Chief Complaint   Patient presents with    Abdominal Pain     Pt reports generalized abdominal pain/ nausea and diarrhea since friday  Pt also reports vomiting since this am  Pt reports having liposuction 1 month ago and stats the right side of her body feels puffy  Pt denies fevers      Patient is a 28-year-old female presenting to the emergency department for evaluation of abdominal pain  Patient states since Friday she began with generalized abdominal pain, multiple episodes of NBNB vomiting and watery diarrhea  Patient states symptoms are gradually worsening  Patient states she has not been able to eat anything over the past few days  Patient has not tried any medication for symptoms  Patient reports she had Botox Brazilian buttock lift & liposuction 1 month ago  Patient states she noticed the right side of her abdomen appears more swollen  Patient without fevers, dysuria, hematuria, flank pain, melena, hematochezia, hematemesis, sick contacts or recent travel, no recent abx usage  Prior to Admission Medications   Prescriptions Last Dose Informant Patient Reported? Taking? albuterol (5 mg/mL) 0 5 % nebulizer solution   No No   Sig: Take 0 5 mL (2 5 mg total) by nebulization every 6 (six) hours as needed for wheezing   albuterol (PROVENTIL HFA,VENTOLIN HFA) 90 mcg/act inhaler   No No   Sig: Inhale 2 puffs every 4 (four) hours as needed for wheezing   benzonatate (TESSALON PERLES) 100 mg capsule   No No   Sig: Take 1 capsule (100 mg total) by mouth every 8 (eight) hours   fluticasone (FLONASE) 50 mcg/act nasal spray   No No   Si spray into each nostril daily   guaiFENesin (MUCINEX) 600 mg 12 hr tablet   No No   Sig: Take 2 tablets (1,200 mg total) by mouth every 12 (twelve) hours      Facility-Administered Medications: None       Past Medical History:   Diagnosis Date    No known problems        History reviewed  No pertinent surgical history  History reviewed   No pertinent family history  I have reviewed and agree with the history as documented  E-Cigarette/Vaping     E-Cigarette/Vaping Substances     Social History     Tobacco Use    Smoking status: Never Smoker    Smokeless tobacco: Never Used   Substance Use Topics    Alcohol use: Never     Frequency: Never    Drug use: Not on file       Review of Systems   Constitutional: Negative for chills and fever  HENT: Negative for ear pain and sore throat  Eyes: Negative for visual disturbance  Respiratory: Negative for cough and shortness of breath  Cardiovascular: Negative for chest pain, palpitations and leg swelling  Gastrointestinal: Positive for abdominal distention (swelling to right side), abdominal pain, diarrhea, nausea and vomiting  Genitourinary: Negative for dysuria and hematuria  Musculoskeletal: Negative for back pain and neck pain  Skin: Negative for rash  Neurological: Negative for speech difficulty and headaches  Psychiatric/Behavioral: Negative for confusion  Physical Exam  Physical Exam  Constitutional:       General: She is not in acute distress  Appearance: She is well-developed  She is not ill-appearing, toxic-appearing or diaphoretic  HENT:      Head: Normocephalic and atraumatic  Right Ear: External ear normal       Left Ear: External ear normal       Nose: Nose normal       Mouth/Throat:      Lips: Pink  Mouth: Mucous membranes are moist    Eyes:      Extraocular Movements: Extraocular movements intact  Conjunctiva/sclera: Conjunctivae normal    Neck:      Musculoskeletal: Normal range of motion and neck supple  Cardiovascular:      Rate and Rhythm: Normal rate and regular rhythm  Pulmonary:      Effort: Pulmonary effort is normal       Breath sounds: Normal breath sounds  No decreased breath sounds, wheezing, rhonchi or rales  Abdominal:      General: Abdomen is flat  There is no distension  Palpations: Abdomen is soft  Tenderness:  There is generalized abdominal tenderness  There is no guarding or rebound  Skin:     General: Skin is warm  Capillary Refill: Capillary refill takes less than 2 seconds  Coloration: Skin is not jaundiced or pale  Findings: No rash  Neurological:      Mental Status: She is alert and oriented to person, place, and time     Psychiatric:         Mood and Affect: Mood and affect normal          Speech: Speech normal          Vital Signs  ED Triage Vitals   Temperature Pulse Respirations Blood Pressure SpO2   06/07/21 1136 06/07/21 1136 06/07/21 1136 06/07/21 1136 06/07/21 1136   99 1 °F (37 3 °C) 95 18 131/69 98 %      Temp Source Heart Rate Source Patient Position - Orthostatic VS BP Location FiO2 (%)   06/07/21 1136 06/07/21 1136 06/07/21 1136 06/07/21 1136 --   Oral Monitor Sitting Right arm       Pain Score       06/07/21 1311       8           Vitals:    06/07/21 1136 06/07/21 1311 06/07/21 1545   BP: 131/69 115/69 115/76   Pulse: 95 74 82   Patient Position - Orthostatic VS: Sitting Lying Sitting         Visual Acuity      ED Medications  Medications   sodium chloride 0 9 % bolus 1,000 mL (0 mL Intravenous Stopped 6/7/21 1340)   ondansetron (ZOFRAN) injection 4 mg (4 mg Intravenous Given 6/7/21 1219)   dicyclomine (BENTYL) tablet 20 mg (20 mg Oral Given 6/7/21 1220)   iohexol (OMNIPAQUE) 350 MG/ML injection (SINGLE-DOSE) 100 mL (100 mL Intravenous Given 6/7/21 1325)   morphine (PF) 4 mg/mL injection 4 mg (4 mg Intravenous Given 6/7/21 1342)       Diagnostic Studies  Results Reviewed     Procedure Component Value Units Date/Time    Stool Enteric Bacterial Panel by PCR [[id number]]  (Normal) Collected: 06/07/21 1543    Lab Status: Final result Specimen: Stool from Rectum Updated: 06/08/21 1440     Salmonella sp PCR None Detected     Shigella sp/Enteroinvasive E  coli (EIEC) PCR None Detected     Campylobacter sp (jejuni and coli) PCR None Detected     Shiga toxin 1/Shiga toxin 2 genes PCR None Detected Clostridium difficile toxin by PCR with EIA [[id number]]  (Abnormal) Collected: 06/07/21 1543    Lab Status: Final result Specimen: Stool from Per Rectum Updated: 06/08/21 0611      C difficile toxin by PCR  Positive     C difficile Toxins A+B, EIA Positive    Narrative:           Comprehensive metabolic panel [[id number]]  (Abnormal) Collected: 06/07/21 1241    Lab Status: Final result Specimen: Blood from Arm, Left Updated: 06/07/21 1307     Sodium 143 mmol/L      Potassium 3 9 mmol/L      Chloride 107 mmol/L      CO2 25 mmol/L      ANION GAP 11 mmol/L      BUN 9 mg/dL      Creatinine 0 66 mg/dL      Glucose 92 mg/dL      Calcium 9 0 mg/dL      Corrected Calcium 9 5 mg/dL      AST 30 U/L      ALT 50 U/L      Alkaline Phosphatase 79 U/L      Total Protein 7 0 g/dL      Albumin 3 4 g/dL      Total Bilirubin 0 54 mg/dL      eGFR 116 ml/min/1 73sq m     Narrative:      [place] guidelines for Chronic Kidney Disease (CKD):     Stage 1 with normal or high GFR (GFR > 90 mL/min/1 73 square meters)    Stage 2 Mild CKD (GFR = 60-89 mL/min/1 73 square meters)    Stage 3A Moderate CKD (GFR = 45-59 mL/min/1 73 square meters)    Stage 3B Moderate CKD (GFR = 30-44 mL/min/1 73 square meters)    Stage 4 Severe CKD (GFR = 15-29 mL/min/1 73 square meters)    Stage 5 End Stage CKD (GFR <15 mL/min/1 73 square meters)  Note: GFR calculation is accurate only with a steady state creatinine    Lipase [[id number]]  (Abnormal) Collected: 06/07/21 1241    Lab Status: Final result Specimen: Blood from Arm, Left Updated: 06/07/21 1307     Lipase 30 u/L     POCT pregnancy, urine [[id number]]  (Normal) Resulted: 06/07/21 1304    Lab Status: Final result Specimen: Urine Updated: 06/07/21 1305     EXT PREG TEST UR (Ref: Negative) negative     Control valid    Urine Macroscopic, POC [[id number]]  (Abnormal) Collected: 06/07/21 1303    Lab Status: Final result Specimen: Urine Updated: 06/07/21 1304     Color, UA Yellow Clarity, UA Clear     pH, UA 5 5     Leukocytes, UA Negative     Nitrite, UA Negative     Protein, UA Negative mg/dl      Glucose, UA Negative mg/dl      Ketones, UA 40 (2+) mg/dl      Urobilinogen, UA 0 2 E U /dl      Bilirubin, UA Interference- unable to analyze     Blood, UA Negative     Specific Gravity, UA >=1 030    Narrative:      CLINITEK RESULT    CBC and differential [[id number]]  (Abnormal) Collected: 06/07/21 1201    Lab Status: Final result Specimen: Blood from Arm, Left Updated: 06/07/21 1209     WBC 14 58 Thousand/uL      RBC 4 82 Million/uL      Hemoglobin 15 0 g/dL      Hematocrit 47 7 %      MCV 99 fL      MCH 31 1 pg      MCHC 31 4 g/dL      RDW 14 2 %      MPV 10 8 fL      Platelets 469 Thousands/uL      nRBC 0 /100 WBCs      Neutrophils Relative 84 %      Immat GRANS % 1 %      Lymphocytes Relative 7 %      Monocytes Relative 8 %      Eosinophils Relative 0 %      Basophils Relative 0 %      Neutrophils Absolute 12 30 Thousands/µL      Immature Grans Absolute 0 08 Thousand/uL      Lymphocytes Absolute 1 00 Thousands/µL      Monocytes Absolute 1 10 Thousand/µL      Eosinophils Absolute 0 06 Thousand/µL      Basophils Absolute 0 04 Thousands/µL                  CT abdomen pelvis with contrast   Final Result by Alma Montana MD (06/07 1435)      Diffuse colonic thickening, most prominent and severe in degree through the cecum and ascending colon in keeping with a nonspecific pancolitis  C  difficile colitis is part of the differential diagnosis  The study was marked in [facility] for immediate notification        Workstation performed: [id number]                    Procedures  Procedures         ED Course  ED Course as of Jun 09 0825   Mon Jun 07, 2021   1514 Patient feeling improved, attempting to give stool sample                                SBIRT 20yo+      Most Recent Value   SBIRT (23 yo +)   In order to provide better care to our patients, we are screening all of our patients for alcohol and drug use  Would it be okay to ask you these screening questions? No Filed at: 06/07/2021 1347                    [facility]  Number of Diagnoses or Management Options  Abdominal pain: new and does not require workup  C  difficile diarrhea: new and requires workup  Colitis: new and requires workup  Diarrhea: new and does not require workup  Nausea and vomiting: new and does not require workup  Diagnosis management comments: Patient is a 72-year-old female presenting to the emergency department for evaluation of abdominal pain  Patient states since Friday she began with generalized abdominal pain, multiple episodes of NBNB vomiting and watery diarrhea  Patient states symptoms are gradually worsening  Patient states she has not been able to eat anything over the past few days  Patient has not tried any medication for symptoms  Patient reports she had Botox Brazilian buttock lift & liposuction 1 month ago  Patient states she noticed the right side of her abdomen appears more swollen  No recent abx  Urine pregnancy negative  UA shows no infection, 2+ ketones, IVF given  Leukocytosis noted  Zofran, morphine and bentyl given with improvement in symptoms  CT a/p shows Diffuse colonic thickening, most prominent and severe in degree through the cecum and ascending colon in keeping with a nonspecific pancolitis  C  difficile colitis is part of the differential diagnosis  Patient was able to provide stool sample, enteric bacterial panel and c-diff ordered, patient will be notified of positive results and treated appropriately  Will provide rx for zofran and bentyl for symptomatic relief  Encouraged to increase fluid intake, eat BRAT diet and f/u with PCP/GI  Patient verbalizes understanding and agrees with plan  The management plan was discussed in detail with the patient at bedside and all questions were answered  Prior to discharge, I provided both verbal and written instructions   I discussed with the patient the signs and symptoms for which to return to the emergency department  All questions were answered and patient was comfortable with the plan of care and discharged to home  The patient agrees to return to the Emergency Department for concerns and/or progression of illness  Disposition  Final diagnoses:   Colitis   Abdominal pain   Nausea and vomiting   Diarrhea   C  difficile diarrhea     Time reflects when diagnosis was documented in both MDM as applicable and the Disposition within this note     Time User Action Codes Description Comment    6/7/2021  3:20 PM Di Nunn Add [K52 9] Colitis     6/7/2021  3:20 PM Di Nunn Add [R10 9] Abdominal pain     6/7/2021  3:20 PM Di Nunn Add [R11 2] Nausea and vomiting     6/7/2021  3:20 PM Di Nunn Add [R19 7] Diarrhea     6/8/2021  6:23 AM Cynthia Gear Add [A04 72] C  difficile diarrhea       ED Disposition     ED Disposition Condition Date/Time Comment    Discharge Stable Mon Jun 7, 2021  3:20 PM Deb Franklin discharge to home/self care              Follow-up Information     Follow up With Specialties Details Why Contact Info Additional Tanya Muller [facility] Gastro[facility]ology Schedule an appointment as soon as possible for a visit in 2 days  [address]  [place] [address] [facility] [phone]  [place] [address] [facility], [address], [address] [place] [address], [place], [place], [id number] [phone]          Discharge Medication List as of 6/7/2021  3:47 PM      START taking these medications    Details   dicyclomine (BENTYL) 20 mg tablet Take 1 tablet (20 mg total) by mouth 2 (two) times a day, Starting Mon 6/7/2021, Print      ondansetron (ZOFRAN-ODT) 4 mg disintegrating tablet Take 1 tablet (4 mg total) by mouth every 6 (six) hours as needed for nausea or vomiting, Starting Mon 6/7/2021, Print         CONTINUE these medications which have NOT CHANGED    Details   albuterol (5 mg/mL) 0 5 % nebulizer solution Take 0 5 mL (2 5 mg total) by nebulization every 6 (six) hours as needed for wheezing, Starting Fri 11/15/2019, Print      albuterol (PROVENTIL HFA,VENTOLIN HFA) 90 mcg/act inhaler Inhale 2 puffs every 4 (four) hours as needed for wheezing, Starting Fri 11/15/2019, Print      benzonatate (TESSALON PERLES) 100 mg capsule Take 1 capsule (100 mg total) by mouth every 8 (eight) hours, Starting Fri 11/15/2019, Print      fluticasone (FLONASE) 50 mcg/act nasal spray 1 spray into each nostril daily, Starting Fri 11/15/2019, Print      guaiFENesin (MUCINEX) 600 mg 12 hr tablet Take 2 tablets (1,200 mg total) by mouth every 12 (twelve) hours, Starting Fri 11/15/2019, Print           No discharge procedures on file      PDMP Review     None          ED Provider  Electronically Signed by           Jaret Polo PA-C  06/09/21 1897

## 2021-06-07 NOTE — Clinical Note
Krystal Kim was seen and treated in our emergency department on 6/7/2021  Diagnosis:     Wesley Lora  may return to work on return date  She may return on this date: 06/11/2021         If you have any questions or concerns, please don't hesitate to call        Daxa Loomis PA-C    ______________________________           _______________          _______________  Hospital Representative                              Date                                Time

## 2021-06-08 LAB
C DIFF TOX A+B STL QL IA: POSITIVE
C DIFF TOX B TCDB STL QL NAA+PROBE: POSITIVE
CAMPYLOBACTER DNA SPEC NAA+PROBE: NORMAL
SALMONELLA DNA SPEC QL NAA+PROBE: NORMAL
SHIGA TOXIN STX GENE SPEC NAA+PROBE: NORMAL
SHIGELLA DNA SPEC QL NAA+PROBE: NORMAL

## 2021-06-08 RX ORDER — VANCOMYCIN HYDROCHLORIDE 125 MG/1
125 CAPSULE ORAL 4 TIMES DAILY
Qty: 40 CAPSULE | Refills: 0 | Status: SHIPPED | OUTPATIENT
Start: 2021-06-08 | End: 2021-06-18

## 2021-06-08 RX ORDER — VANCOMYCIN HYDROCHLORIDE 125 MG/1
125 CAPSULE ORAL 4 TIMES DAILY
Qty: 40 CAPSULE | Refills: 0 | Status: SHIPPED | OUTPATIENT
Start: 2021-06-08 | End: 2021-06-08 | Stop reason: SDUPTHER

## 2021-06-08 NOTE — ED PROVIDER NOTES
Discussed recent positive C  Diff results with patient  Reports she is still symptomatic  Will send rx for oral vancomycin to pharmacy  Follow up with PCP encouraged        Kaylyn Faulkner PA-C  06/08/21 4746

## 2021-06-11 ENCOUNTER — HOSPITAL ENCOUNTER (EMERGENCY)
Facility: HOSPITAL | Age: 36
Discharge: HOME | End: 2021-06-11
Attending: EMERGENCY MEDICINE | Admitting: EMERGENCY MEDICINE
Payer: COMMERCIAL

## 2021-06-11 VITALS
RESPIRATION RATE: 18 BRPM | BODY MASS INDEX: 29.69 KG/M2 | OXYGEN SATURATION: 95 % | HEART RATE: 78 BPM | WEIGHT: 189.6 LBS | SYSTOLIC BLOOD PRESSURE: 114 MMHG | TEMPERATURE: 97.8 F | DIASTOLIC BLOOD PRESSURE: 65 MMHG

## 2021-06-11 DIAGNOSIS — A04.72 C. DIFFICILE COLITIS: Primary | ICD-10-CM

## 2021-06-11 LAB
ALBUMIN SERPL BCP-MCNC: 3 G/DL (ref 3.5–5)
ALP SERPL-CCNC: 90 U/L (ref 46–116)
ALT SERPL W P-5'-P-CCNC: 36 U/L (ref 12–78)
ANION GAP SERPL CALCULATED.3IONS-SCNC: 7 MMOL/L (ref 4–13)
AST SERPL W P-5'-P-CCNC: 22 U/L (ref 5–45)
BACTERIA UR QL AUTO: ABNORMAL /HPF
BASOPHILS # BLD AUTO: 0.05 THOUSANDS/ΜL (ref 0–0.1)
BASOPHILS NFR BLD AUTO: 1 % (ref 0–1)
BILIRUB SERPL-MCNC: 0.2 MG/DL (ref 0.2–1)
BILIRUB UR QL STRIP: NEGATIVE
BUN SERPL-MCNC: 8 MG/DL (ref 5–25)
CALCIUM ALBUM COR SERPL-MCNC: 9.5 MG/DL (ref 8.3–10.1)
CALCIUM SERPL-MCNC: 8.7 MG/DL (ref 8.3–10.1)
CHLORIDE SERPL-SCNC: 104 MMOL/L (ref 100–108)
CLARITY UR: ABNORMAL
CO2 SERPL-SCNC: 32 MMOL/L (ref 21–32)
COLOR UR: YELLOW
CREAT SERPL-MCNC: 0.84 MG/DL (ref 0.6–1.3)
EOSINOPHIL # BLD AUTO: 0.46 THOUSAND/ΜL (ref 0–0.61)
EOSINOPHIL NFR BLD AUTO: 5 % (ref 0–6)
ERYTHROCYTE [DISTWIDTH] IN BLOOD BY AUTOMATED COUNT: 13.8 % (ref 11.6–15.1)
EXT PREG TEST URINE: NEGATIVE
EXT. CONTROL ED NAV: NORMAL
GFR SERPL CREATININE-BSD FRML MDRD: 91 ML/MIN/1.73SQ M
GLUCOSE SERPL-MCNC: 87 MG/DL (ref 65–140)
GLUCOSE UR STRIP-MCNC: NEGATIVE MG/DL
HCT VFR BLD AUTO: 45.5 % (ref 34.8–46.1)
HGB BLD-MCNC: 14.5 G/DL (ref 11.5–15.4)
HGB UR QL STRIP.AUTO: ABNORMAL
IMM GRANULOCYTES # BLD AUTO: 0.06 THOUSAND/UL (ref 0–0.2)
IMM GRANULOCYTES NFR BLD AUTO: 1 % (ref 0–2)
KETONES UR STRIP-MCNC: NEGATIVE MG/DL
LACTATE SERPL-SCNC: 2.2 MMOL/L (ref 0.5–2)
LEUKOCYTE ESTERASE UR QL STRIP: ABNORMAL
LYMPHOCYTES # BLD AUTO: 1.19 THOUSANDS/ΜL (ref 0.6–4.47)
LYMPHOCYTES NFR BLD AUTO: 14 % (ref 14–44)
MAGNESIUM SERPL-MCNC: 1.9 MG/DL (ref 1.6–2.6)
MCH RBC QN AUTO: 31.3 PG (ref 26.8–34.3)
MCHC RBC AUTO-ENTMCNC: 31.9 G/DL (ref 31.4–37.4)
MCV RBC AUTO: 98 FL (ref 82–98)
MONOCYTES # BLD AUTO: 0.56 THOUSAND/ΜL (ref 0.17–1.22)
MONOCYTES NFR BLD AUTO: 7 % (ref 4–12)
NEUTROPHILS # BLD AUTO: 6.19 THOUSANDS/ΜL (ref 1.85–7.62)
NEUTS SEG NFR BLD AUTO: 72 % (ref 43–75)
NITRITE UR QL STRIP: NEGATIVE
NON-SQ EPI CELLS URNS QL MICRO: ABNORMAL /HPF
NRBC BLD AUTO-RTO: 0 /100 WBCS
PH UR STRIP.AUTO: 6 [PH] (ref 4.5–8)
PLATELET # BLD AUTO: 328 THOUSANDS/UL (ref 149–390)
PMV BLD AUTO: 10.8 FL (ref 8.9–12.7)
POTASSIUM SERPL-SCNC: 3.7 MMOL/L (ref 3.5–5.3)
PROT SERPL-MCNC: 7.2 G/DL (ref 6.4–8.2)
PROT UR STRIP-MCNC: ABNORMAL MG/DL
RBC # BLD AUTO: 4.63 MILLION/UL (ref 3.81–5.12)
RBC #/AREA URNS AUTO: ABNORMAL /HPF
SODIUM SERPL-SCNC: 143 MMOL/L (ref 136–145)
SP GR UR STRIP.AUTO: >=1.03 (ref 1–1.03)
UROBILINOGEN UR QL STRIP.AUTO: 0.2 E.U./DL
WBC # BLD AUTO: 8.51 THOUSAND/UL (ref 4.31–10.16)
WBC #/AREA URNS AUTO: ABNORMAL /HPF

## 2021-06-11 PROCEDURE — 96365 THER/PROPH/DIAG IV INF INIT: CPT

## 2021-06-11 PROCEDURE — 81001 URINALYSIS AUTO W/SCOPE: CPT

## 2021-06-11 PROCEDURE — 83605 ASSAY OF LACTIC ACID: CPT | Performed by: EMERGENCY MEDICINE

## 2021-06-11 PROCEDURE — 36415 COLL VENOUS BLD VENIPUNCTURE: CPT | Performed by: EMERGENCY MEDICINE

## 2021-06-11 PROCEDURE — 99284 EMERGENCY DEPT VISIT MOD MDM: CPT

## 2021-06-11 PROCEDURE — 83735 ASSAY OF MAGNESIUM: CPT | Performed by: EMERGENCY MEDICINE

## 2021-06-11 PROCEDURE — 85025 COMPLETE CBC W/AUTO DIFF WBC: CPT | Performed by: EMERGENCY MEDICINE

## 2021-06-11 PROCEDURE — 80053 COMPREHEN METABOLIC PANEL: CPT | Performed by: EMERGENCY MEDICINE

## 2021-06-11 PROCEDURE — 81025 URINE PREGNANCY TEST: CPT | Performed by: EMERGENCY MEDICINE

## 2021-06-11 PROCEDURE — 99284 EMERGENCY DEPT VISIT MOD MDM: CPT | Performed by: EMERGENCY MEDICINE

## 2021-06-11 RX ADMIN — SODIUM CHLORIDE, SODIUM LACTATE, POTASSIUM CHLORIDE, AND CALCIUM CHLORIDE 1000 ML: .6; .31; .03; .02 INJECTION, SOLUTION INTRAVENOUS at 08:04

## 2021-06-11 NOTE — ED PROVIDER NOTES
History  Chief Complaint   Patient presents with    Abdominal Pain     Seen a few days ago for same - reporting worsening abdominal pain and diarrhea  Taking prescribed medications without relief  History provided by:  Patient   used: No    Medical Problem  Location:  Patient states she is here because she was supposed to return to work today and she was concerned because she was recently diagnosed with C diff on the 7th and has been taking vancomycin  At 3 loose stools yesterday and overall she rates all of her symptoms as better  No fever  No bleeding  The abdominal pain while still having some crampiness is better  She was able to eat yesterday  She is taking vancomycin which was prescribed for 10 days  Severity:  Moderate  Onset quality:  Gradual  Duration: Has had symptoms since at least the 7th  Timing:  Constant  Progression:  Partially resolved  Chronicity:  New  Relieved by:  Medications  Worsened by:  Nothing  Ineffective treatments:  Currently taking vancomycin  Associated symptoms: abdominal pain and diarrhea    Associated symptoms: no chest pain, no fever, no nausea, no shortness of breath and no vomiting    Risk factors:  She had a [place] butt lift performed in [place] within the last several months which I believe may be the risk factor as she most likely had an antibiotic preop as well as traveling  Nobody around her has been ill with this  Prior to Admission Medications   Prescriptions Last Dose Informant Patient Reported? Taking?    albuterol (5 mg/mL) 0 5 % nebulizer solution   No No   Sig: Take 0 5 mL (2 5 mg total) by nebulization every 6 (six) hours as needed for wheezing   albuterol (PROVENTIL HFA,VENTOLIN HFA) 90 mcg/act inhaler   No No   Sig: Inhale 2 puffs every 4 (four) hours as needed for wheezing   benzonatate (TESSALON PERLES) 100 mg capsule   No No   Sig: Take 1 capsule (100 mg total) by mouth every 8 (eight) hours   dicyclomine (BENTYL) 20 mg tablet   No Yes   Sig: Take 1 tablet (20 mg total) by mouth 2 (two) times a day   fluticasone (FLONASE) 50 mcg/act nasal spray   No No   Si spray into each nostril daily   guaiFENesin (MUCINEX) 600 mg 12 hr tablet   No No   Sig: Take 2 tablets (1,200 mg total) by mouth every 12 (twelve) hours   ondansetron (ZOFRAN-ODT) 4 mg disintegrating tablet   No Yes   Sig: Take 1 tablet (4 mg total) by mouth every 6 (six) hours as needed for nausea or vomiting   vancomycin (VANCOCIN) 125 MG capsule   No Yes   Sig: Take 1 capsule (125 mg total) by mouth 4 (four) times a day for 10 days      Facility-Administered Medications: None       Past Medical History:   Diagnosis Date    No known problems        History reviewed  No pertinent surgical history  History reviewed  No pertinent family history  I have reviewed and agree with the history as documented  E-Cigarette/Vaping     E-Cigarette/Vaping Substances     Social History     Tobacco Use    Smoking status: Never Smoker    Smokeless tobacco: Never Used   Substance Use Topics    Alcohol use: Never     Frequency: Never    Drug use: Not on file       Review of Systems   Constitutional: Positive for appetite change  Negative for fever  Respiratory: Negative for shortness of breath  Cardiovascular: Negative for chest pain  Gastrointestinal: Positive for abdominal pain and diarrhea  Negative for blood in stool, nausea and vomiting  Genitourinary: Negative for difficulty urinating  All other systems reviewed and are negative  Physical Exam  Physical Exam  Vitals signs and nursing note reviewed  Constitutional:       General: She is not in acute distress  Appearance: Normal appearance  She is well-developed  She is not ill-appearing, toxic-appearing or diaphoretic  HENT:      Head: Normocephalic and atraumatic  Right Ear: Hearing normal  No drainage or swelling  Left Ear: Hearing normal  No drainage or swelling     Eyes:      General: Lids are normal          Right eye: No discharge  Left eye: No discharge  Conjunctiva/sclera: Conjunctivae normal    Neck:      Musculoskeletal: Normal range of motion  Vascular: No JVD  Trachea: Trachea normal    Cardiovascular:      Rate and Rhythm: Normal rate and regular rhythm  Pulses: Normal pulses  Heart sounds: Normal heart sounds  No murmur  No friction rub  No gallop  Pulmonary:      Effort: Pulmonary effort is normal  No respiratory distress  Breath sounds: Normal breath sounds  No stridor  No wheezing or rales  Chest:      Chest wall: No tenderness  Abdominal:      General: Bowel sounds are normal       Palpations: Abdomen is soft  Tenderness: There is no abdominal tenderness  There is no guarding or rebound  Musculoskeletal: Normal range of motion  General: No tenderness or deformity  Lymphadenopathy:      Cervical: No cervical adenopathy  Skin:     General: Skin is warm and dry  Coloration: Skin is not pale  Findings: No rash  Neurological:      General: No focal deficit present  Mental Status: She is alert  GCS: GCS eye subscore is 4  GCS verbal subscore is 5  GCS motor subscore is 6  Sensory: No sensory deficit  Motor: No abnormal muscle tone  Psychiatric:         Mood and Affect: Mood normal          Speech: Speech normal          Behavior: Behavior is cooperative           Vital Signs  ED Triage Vitals   Temperature Pulse Respirations Blood Pressure SpO2   06/11/21 0720 06/11/21 0720 06/11/21 0720 06/11/21 0720 06/11/21 0720   97 8 °F (36 6 °C) 77 18 131/66 96 %      Temp Source Heart Rate Source Patient Position - Orthostatic VS BP Location FiO2 (%)   06/11/21 0720 06/11/21 0720 06/11/21 0720 06/11/21 0720 --   Oral Monitor Sitting Right arm       Pain Score       06/11/21 0722       3           Vitals:    06/11/21 0720 06/11/21 0831   BP: 131/66 114/65   Pulse: 77 78   Patient Position - Orthostatic VS: Sitting Sitting         Visual Acuity      ED Medications  Medications   lactated ringers bolus 1,000 mL (0 mL Intravenous Stopped 6/11/21 0906)       Diagnostic Studies  Results Reviewed     Procedure Component Value Units Date/Time    Lactic acid [[id number]]  (Abnormal) Collected: 06/11/21 0803    Lab Status: Final result Specimen: Blood from Arm, Left Updated: 06/11/21 0846     LACTIC ACID 2 2 mmol/L     Narrative:      Result may be elevated if tourniquet was used during collection      Lactic acid 2 Hours [[id number]]     Lab Status: No result Specimen: Blood     Comprehensive metabolic panel [[id number]]  (Abnormal) Collected: 06/11/21 0803    Lab Status: Final result Specimen: Blood from Arm, Left Updated: 06/11/21 0840     Sodium 143 mmol/L      Potassium 3 7 mmol/L      Chloride 104 mmol/L      CO2 32 mmol/L      ANION GAP 7 mmol/L      BUN 8 mg/dL      Creatinine 0 84 mg/dL      Glucose 87 mg/dL      Calcium 8 7 mg/dL      Corrected Calcium 9 5 mg/dL      AST 22 U/L      ALT 36 U/L      Alkaline Phosphatase 90 U/L      Total Protein 7 2 g/dL      Albumin 3 0 g/dL      Total Bilirubin 0 20 mg/dL      eGFR 91 ml/min/1 73sq m     Narrative:      [place] guidelines for Chronic Kidney Disease (CKD):     Stage 1 with normal or high GFR (GFR > 90 mL/min/1 73 square meters)    Stage 2 Mild CKD (GFR = 60-89 mL/min/1 73 square meters)    Stage 3A Moderate CKD (GFR = 45-59 mL/min/1 73 square meters)    Stage 3B Moderate CKD (GFR = 30-44 mL/min/1 73 square meters)    Stage 4 Severe CKD (GFR = 15-29 mL/min/1 73 square meters)    Stage 5 End Stage CKD (GFR <15 mL/min/1 73 square meters)  Note: GFR calculation is accurate only with a steady state creatinine    Magnesium [[id number]]  (Normal) Collected: 06/11/21 0803    Lab Status: Final result Specimen: Blood from Arm, Left Updated: 06/11/21 0840     Magnesium 1 9 mg/dL     Urine Microscopic [[id number]] Collected: 06/11/21 0830    Lab Status:  In process Specimen: Urine, Clean Catch Updated: 06/11/21 0835    POCT pregnancy, urine [[id number]]  (Normal) Resulted: 06/11/21 0833    Lab Status: Final result Updated: 06/11/21 0833     EXT PREG TEST UR (Ref: Negative) Negative     Control Valid    POCT urinalysis dipstick [[id number]]  (Abnormal) Resulted: 06/11/21 0832    Lab Status: Final result Specimen: Urine Updated: 06/11/21 0832    Urine Macroscopic, POC [[id number]]  (Abnormal) Collected: 06/11/21 0830    Lab Status: Final result Specimen: Urine Updated: 06/11/21 0831     Color, UA Yellow     Clarity, UA Cloudy     pH, UA 6 0     Leukocytes, UA Trace     Nitrite, UA Negative     Protein, UA Trace mg/dl      Glucose, UA Negative mg/dl      Ketones, UA Negative mg/dl      Urobilinogen, UA 0 2 E U /dl      Bilirubin, UA Negative     Blood, UA Trace     Specific Gravity, UA >=1 030    Narrative:      CLINITEK RESULT    CBC and differential [[id number]] Collected: 06/11/21 0803    Lab Status: Final result Specimen: Blood from Arm, Left Updated: 06/11/21 0816     WBC 8 51 Thousand/uL      RBC 4 63 Million/uL      Hemoglobin 14 5 g/dL      Hematocrit 45 5 %      MCV 98 fL      MCH 31 3 pg      MCHC 31 9 g/dL      RDW 13 8 %      MPV 10 8 fL      Platelets 928 Thousands/uL      nRBC 0 /100 WBCs      Neutrophils Relative 72 %      Immat GRANS % 1 %      Lymphocytes Relative 14 %      Monocytes Relative 7 %      Eosinophils Relative 5 %      Basophils Relative 1 %      Neutrophils Absolute 6 19 Thousands/µL      Immature Grans Absolute 0 06 Thousand/uL      Lymphocytes Absolute 1 19 Thousands/µL      Monocytes Absolute 0 56 Thousand/µL      Eosinophils Absolute 0 46 Thousand/µL      Basophils Absolute 0 05 Thousands/µL                  No orders to display              Procedures  Procedures         ED Course                             SBIRT 22yo+      Most Recent Value   SBIRT (22 yo +)   In order to provide better care to our patients, we are screening all of our patients for alcohol and drug use  Would it be okay to ask you these screening questions? Yes Filed at: 06/11/2021 0805   Initial Alcohol Screen: US AUDIT-C    1  How often do you have a drink containing alcohol? 1 Filed at: 06/11/2021 0805   2  How many drinks containing alcohol do you have on a typical day you are drinking? 1 Filed at: 06/11/2021 0805   3b  FEMALE Any Age, or MALE 65+: How often do you have 4 or more drinks on one occassion? 0 Filed at: 06/11/2021 0805   Audit-C Score  2 Filed at: 06/11/2021 5611   PRITI: How many times in the past year have you    Used an illegal drug or used a prescription medication for non-medical reasons? Never Filed at: 06/11/2021 0805                    [facility]  Number of Diagnoses or Management Options  C  difficile colitis:   Diagnosis management comments: With blood cell count is improved normal   She has a benign abdominal exam   My suspicion is that the elevated lactate is secondary to dehydration as she has had decreased p o  Intake  She was hydrated with a L of fluids here  She is overall feeling better  She was instructed to call her family doctor today's schedule an outpatient follow-up appoint for next week and I gave her no for work to keep her out of work until she finishes her course of antibiotics  She may need repeat testing  Patient was given return precautions over the weekend including fever, worsening pain, bleeding         Amount and/or Complexity of Data Reviewed  Clinical lab tests: ordered and reviewed  Tests in the radiology section of CPT®: reviewed  Review and summarize past medical records: yes    Patient Progress  Patient progress: stable      Disposition  Final diagnoses:   C  difficile colitis     Time reflects when diagnosis was documented in both MDM as applicable and the Disposition within this note     Time User Action Codes Description Comment    6/11/2021  7:55 AM Paul Client Add [A04 72] C  difficile colitis ED Disposition     ED Disposition Condition Date/Time Comment    Discharge Stable Fri Jun 11, 2021  8:58 AM Krystal Alvarez discharge to home/self care  Follow-up Information     Follow up With Specialties Details Why [facility] Schedule an appointment as soon as possible for a visit in 1 week for reevaluation and clearance back to work Kosta De La [place] 9            Discharge Medication List as of 6/11/2021  8:59 AM      CONTINUE these medications which have NOT CHANGED    Details   dicyclomine (BENTYL) 20 mg tablet Take 1 tablet (20 mg total) by mouth 2 (two) times a day, Starting Mon 6/7/2021, Print      ondansetron (ZOFRAN-ODT) 4 mg disintegrating tablet Take 1 tablet (4 mg total) by mouth every 6 (six) hours as needed for nausea or vomiting, Starting Mon 6/7/2021, Print      vancomycin (VANCOCIN) 125 MG capsule Take 1 capsule (125 mg total) by mouth 4 (four) times a day for 10 days, Starting Tue 6/8/2021, Until Fri 6/18/2021, Normal      albuterol (5 mg/mL) 0 5 % nebulizer solution Take 0 5 mL (2 5 mg total) by nebulization every 6 (six) hours as needed for wheezing, Starting Fri 11/15/2019, Print      albuterol (PROVENTIL HFA,VENTOLIN HFA) 90 mcg/act inhaler Inhale 2 puffs every 4 (four) hours as needed for wheezing, Starting Fri 11/15/2019, Print      benzonatate (TESSALON PERLES) 100 mg capsule Take 1 capsule (100 mg total) by mouth every 8 (eight) hours, Starting Fri 11/15/2019, Print      fluticasone (FLONASE) 50 mcg/act nasal spray 1 spray into each nostril daily, Starting Fri 11/15/2019, Print      guaiFENesin (MUCINEX) 600 mg 12 hr tablet Take 2 tablets (1,200 mg total) by mouth every 12 (twelve) hours, Starting Fri 11/15/2019, Print           No discharge procedures on file      PDMP Review     None          ED Provider  Electronically Signed by           Melia Caldwell MD  06/11/21 6990

## 2021-06-11 NOTE — Clinical Note
Ramana Ndiaye was seen and treated in our emergency department on 6/11/2021  No work until cleared by Family Doctor/Orthopedics        Diagnosis: C  Diff Colitis    Dahlia    She may return on this date: 06/17/2021         If you have any questions or concerns, please don't hesitate to call        Bailey Hopkins MD    ______________________________           _______________          _______________  Hospital Representative                              Date                                Time

## 2021-10-26 ENCOUNTER — HOSPITAL ENCOUNTER (EMERGENCY)
Facility: HOSPITAL | Age: 36
Discharge: HOME | End: 2021-10-26
Attending: EMERGENCY MEDICINE | Admitting: EMERGENCY MEDICINE
Payer: COMMERCIAL

## 2021-10-26 VITALS
SYSTOLIC BLOOD PRESSURE: 121 MMHG | WEIGHT: 171.74 LBS | OXYGEN SATURATION: 96 % | RESPIRATION RATE: 18 BRPM | HEART RATE: 82 BPM | BODY MASS INDEX: 26.9 KG/M2 | TEMPERATURE: 98.1 F | DIASTOLIC BLOOD PRESSURE: 75 MMHG

## 2021-10-26 DIAGNOSIS — J06.9 URI (UPPER RESPIRATORY INFECTION): ICD-10-CM

## 2021-10-26 DIAGNOSIS — J06.9 VIRAL URI WITH COUGH: Primary | ICD-10-CM

## 2021-10-26 DIAGNOSIS — J45.909 ASTHMA: ICD-10-CM

## 2021-10-26 PROCEDURE — 99283 EMERGENCY DEPT VISIT LOW MDM: CPT

## 2021-10-26 PROCEDURE — U0003 INFECTIOUS AGENT DETECTION BY NUCLEIC ACID (DNA OR RNA); SEVERE ACUTE RESPIRATORY SYNDROME CORONAVIRUS 2 (SARS-COV-2) (CORONAVIRUS DISEASE [COVID-19]), AMPLIFIED PROBE TECHNIQUE, MAKING USE OF HIGH THROUGHPUT TECHNOLOGIES AS DESCRIBED BY CMS-2020-01-R: HCPCS | Performed by: PHYSICIAN ASSISTANT

## 2021-10-26 PROCEDURE — U0005 INFEC AGEN DETEC AMPLI PROBE: HCPCS | Performed by: PHYSICIAN ASSISTANT

## 2021-10-26 PROCEDURE — 99284 EMERGENCY DEPT VISIT MOD MDM: CPT | Performed by: PHYSICIAN ASSISTANT

## 2021-10-26 RX ORDER — FLUTICASONE PROPIONATE 50 MCG
1 SPRAY, SUSPENSION (ML) NASAL DAILY
Qty: 16 G | Refills: 0 | Status: SHIPPED | OUTPATIENT
Start: 2021-10-26 | End: 2022-08-10 | Stop reason: ALTCHOICE

## 2021-10-26 RX ORDER — LORATADINE 10 MG/1
10 TABLET ORAL DAILY
Qty: 20 TABLET | Refills: 0 | Status: SHIPPED | OUTPATIENT
Start: 2021-10-26

## 2021-10-26 RX ORDER — ALBUTEROL SULFATE 90 UG/1
2 AEROSOL, METERED RESPIRATORY (INHALATION) EVERY 4 HOURS PRN
Qty: 18 G | Refills: 0 | Status: SHIPPED | OUTPATIENT
Start: 2021-10-26 | End: 2021-11-30 | Stop reason: SDUPTHER

## 2021-10-27 LAB — SARS-COV-2 RNA RESP QL NAA+PROBE: NEGATIVE

## 2021-11-30 ENCOUNTER — HOSPITAL ENCOUNTER (EMERGENCY)
Facility: HOSPITAL | Age: 36
Discharge: HOME | End: 2021-11-30
Attending: EMERGENCY MEDICINE
Payer: COMMERCIAL

## 2021-11-30 VITALS
HEART RATE: 80 BPM | DIASTOLIC BLOOD PRESSURE: 71 MMHG | SYSTOLIC BLOOD PRESSURE: 116 MMHG | WEIGHT: 174.82 LBS | OXYGEN SATURATION: 99 % | RESPIRATION RATE: 16 BRPM | BODY MASS INDEX: 27.38 KG/M2 | TEMPERATURE: 98.6 F

## 2021-11-30 DIAGNOSIS — Z20.822 PERSON UNDER INVESTIGATION FOR COVID-19: Primary | ICD-10-CM

## 2021-11-30 DIAGNOSIS — Z76.0 MEDICATION REFILL: ICD-10-CM

## 2021-11-30 DIAGNOSIS — J06.9 URI (UPPER RESPIRATORY INFECTION): ICD-10-CM

## 2021-11-30 LAB — SARS-COV-2 RNA RESP QL NAA+PROBE: NEGATIVE

## 2021-11-30 PROCEDURE — 99284 EMERGENCY DEPT VISIT MOD MDM: CPT | Performed by: PHYSICIAN ASSISTANT

## 2021-11-30 PROCEDURE — 99283 EMERGENCY DEPT VISIT LOW MDM: CPT

## 2021-11-30 PROCEDURE — U0003 INFECTIOUS AGENT DETECTION BY NUCLEIC ACID (DNA OR RNA); SEVERE ACUTE RESPIRATORY SYNDROME CORONAVIRUS 2 (SARS-COV-2) (CORONAVIRUS DISEASE [COVID-19]), AMPLIFIED PROBE TECHNIQUE, MAKING USE OF HIGH THROUGHPUT TECHNOLOGIES AS DESCRIBED BY CMS-2020-01-R: HCPCS | Performed by: PHYSICIAN ASSISTANT

## 2021-11-30 PROCEDURE — U0005 INFEC AGEN DETEC AMPLI PROBE: HCPCS | Performed by: PHYSICIAN ASSISTANT

## 2021-11-30 RX ORDER — ALBUTEROL SULFATE 90 UG/1
2 AEROSOL, METERED RESPIRATORY (INHALATION) EVERY 4 HOURS PRN
Qty: 18 G | Refills: 0 | Status: SHIPPED | OUTPATIENT
Start: 2021-11-30

## 2022-01-12 ENCOUNTER — OFFICE VISIT (OUTPATIENT)
Dept: URGENT CARE | Age: 37
End: 2022-01-12
Payer: COMMERCIAL

## 2022-01-12 VITALS
HEIGHT: 67 IN | OXYGEN SATURATION: 100 % | WEIGHT: 174 LBS | BODY MASS INDEX: 27.31 KG/M2 | RESPIRATION RATE: 22 BRPM | HEART RATE: 98 BPM | TEMPERATURE: 99 F

## 2022-01-12 DIAGNOSIS — R06.02 SHORTNESS OF BREATH: Primary | ICD-10-CM

## 2022-01-12 PROCEDURE — U0005 INFEC AGEN DETEC AMPLI PROBE: HCPCS | Performed by: PHYSICIAN ASSISTANT

## 2022-01-12 PROCEDURE — U0003 INFECTIOUS AGENT DETECTION BY NUCLEIC ACID (DNA OR RNA); SEVERE ACUTE RESPIRATORY SYNDROME CORONAVIRUS 2 (SARS-COV-2) (CORONAVIRUS DISEASE [COVID-19]), AMPLIFIED PROBE TECHNIQUE, MAKING USE OF HIGH THROUGHPUT TECHNOLOGIES AS DESCRIBED BY CMS-2020-01-R: HCPCS | Performed by: PHYSICIAN ASSISTANT

## 2022-01-12 PROCEDURE — G0382 LEV 3 HOSP TYPE B ED VISIT: HCPCS | Performed by: PHYSICIAN ASSISTANT

## 2022-01-12 NOTE — PROGRESS NOTES
[facility]        NAME: Jj Hope is a 31 y o  female  : 1985    MRN: [id number]  DATE: 2022  TIME: 3:36 PM    Assessment and Plan   Shortness of breath [R06 02]  1  Shortness of breath  COVID Only -Office Collect         Patient Instructions     Follow up with PCP in 3-5 days  Proceed to  ER if symptoms worsen  Chief Complaint     Chief Complaint   Patient presents with    COVID-19     cough and headache with congestion symptosm started about 2-3 days ago  History of Present Illness       62-year-old female presents for possible COVID symptoms  Patient is complaining of cough and headache the past 2-3 days  Patient states that time she has been feeling slightly short of breath and used her albuterol nebulizer treatments  Patient states her children also have minor cold symptoms  Review of Systems   Review of Systems   Constitutional: Negative  HENT: Negative  Eyes: Negative  Respiratory: Positive for cough, chest tightness and shortness of breath  Negative for apnea, choking, wheezing and stridor  Gastrointestinal: Negative  Neurological: Positive for headaches           Current Medications       Current Outpatient Medications:     albuterol (5 mg/mL) 0 5 % nebulizer solution, Take 0 5 mL (2 5 mg total) by nebulization every 6 (six) hours as needed for wheezing, Disp: 20 mL, Rfl: 0    albuterol (PROVENTIL HFA,VENTOLIN HFA) 90 mcg/act inhaler, Inhale 2 puffs every 4 (four) hours as needed for wheezing, Disp: 18 g, Rfl: 0    dicyclomine (BENTYL) 20 mg tablet, Take 1 tablet (20 mg total) by mouth 2 (two) times a day, Disp: 20 tablet, Rfl: 0    fluticasone (FLONASE) 50 mcg/act nasal spray, 1 spray into each nostril daily, Disp: 16 g, Rfl: 0    fluticasone (FLONASE) 50 mcg/act nasal spray, 1 spray into each nostril daily, Disp: 16 g, Rfl: 0    loratadine (CLARITIN) 10 mg tablet, Take 1 tablet (10 mg total) by mouth daily, Disp: 20 tablet, Rfl: 0    benzonatate (TESSALON PERLES) 100 mg capsule, Take 1 capsule (100 mg total) by mouth every 8 (eight) hours (Patient not taking: Reported on 1/12/2022 ), Disp: 21 capsule, Rfl: 0    dextromethorphan-guaifenesin (MUCINEX DM)  MG per 12 hr tablet, Take 1 tablet by mouth every 12 (twelve) hours (Patient not taking: Reported on 1/12/2022 ), Disp: 14 tablet, Rfl: 0    guaiFENesin (MUCINEX) 600 mg 12 hr tablet, Take 2 tablets (1,200 mg total) by mouth every 12 (twelve) hours (Patient not taking: Reported on 1/12/2022 ), Disp: 30 tablet, Rfl: 0    ondansetron (ZOFRAN-ODT) 4 mg disintegrating tablet, Take 1 tablet (4 mg total) by mouth every 6 (six) hours as needed for nausea or vomiting (Patient not taking: Reported on 1/12/2022 ), Disp: 20 tablet, Rfl: 0    Current Allergies     Allergies as of 01/12/2022 - Reviewed 01/12/2022   Allergen Reaction Noted    Shellfish-derived products - food allergy Hives 05/17/2021            The following portions of the patient's history were reviewed and updated as appropriate: allergies, current medications, past family history, past medical history, past social history, past surgical history and problem list     Objective   Pulse 98   Temp 99 °F (37 2 °C)   Resp 22   Ht 5' 7" (1 702 m)   Wt 78 9 kg (174 lb)   SpO2 100%   BMI 27 25 kg/m²        Physical Exam     Physical Exam  Vitals and nursing note reviewed  Constitutional:       General: She is not in acute distress  Appearance: She is not ill-appearing  HENT:      Head: Normocephalic and atraumatic  Right Ear: Tympanic membrane, ear canal and external ear normal       Left Ear: Tympanic membrane, ear canal and external ear normal       Nose: Nose normal       Mouth/Throat:      Mouth: Mucous membranes are moist       Pharynx: No oropharyngeal exudate or posterior oropharyngeal erythema     Eyes:      Conjunctiva/sclera: Conjunctivae normal    Cardiovascular:      Rate and Rhythm: Normal rate and regular rhythm  Pulmonary:      Effort: Pulmonary effort is normal  No respiratory distress  Breath sounds: Normal breath sounds  No wheezing, rhonchi or rales  Lymphadenopathy:      Cervical: No cervical adenopathy  Neurological:      Mental Status: She is alert

## 2022-01-12 NOTE — LETTER
[facility] Radha Foster [address]  [address] [address]  Dept: [phone]    January 12, 2022    Patient: Marcos Guzman  YOB: 1985    Marcos Guzman was seen and evaluated at our [facility]  Please note if Covid and Flu tests are negative, they may return to work when fever free for 24 hours without the use of a fever reducing agent  If Covid or Flu test is positive, they may return to work on 01/15/2022, as this is 5 days from the onset of symptoms  Upon return, they must then adhere to strict masking for an additional 5 days      Sincerely,    Mandi Horne PA-C

## 2022-01-12 NOTE — PATIENT INSTRUCTIONS
COVID testing obtained today  Isolated until your test results are available  Follow work note for guidance on when he can return to work  Use albuterol as needed  Any difficulty breathing or to the emergency room  Cold Symptoms   WHAT YOU NEED TO KNOW:   A cold is an infection caused by a virus  The infection causes your upper respiratory system to become inflamed  Common symptoms of a cold include sneezing, dry throat, a stuffy nose, headache, watery eyes, and a cough  Your cough may be dry, or you may cough up mucus  You may also have muscle aches, joint pain, and tiredness  Rarely, you may have a fever  Most colds go away without treatment  DISCHARGE INSTRUCTIONS:   Return to the emergency department if:   · You have increased tiredness and weakness  · You are unable to eat  · Your heart is beating much faster than usual for you  · You see white spots in the back of your throat and your neck is swollen and sore to the touch  · You see pinpoint or larger reddish-purple dots on your skin  Contact your healthcare provider if:   · You have a fever higher than 102°F (38 9°C)  · You have new or worsening shortness of breath  · You have thick nasal drainage for more than 2 days  · Your symptoms do not improve or get worse within 5 days  · You have questions or concerns about your condition or care  Medicines: The following medicines may be suggested by your healthcare provider to decrease your cold symptoms  These medicines are available without a doctor's order  Ask which medicines to take and when to take them  Follow directions  · NSAIDs or acetaminophen  help to bring down a fever or decrease pain  · Decongestants  help decrease nasal stuffiness  · Antihistamines  help decrease sneezing and a runny nose  · Cough suppressants  help decrease how much you cough  · Expectorants  help loosen mucus so you can cough it up  · Take your medicine as directed  Contact your healthcare provider if you think your medicine is not helping or if you have side effects  Tell him of her if you are allergic to any medicine  Keep a list of the medicines, vitamins, and herbs you take  Include the amounts, and when and why you take them  Bring the list or the pill bottles to follow-up visits  Carry your medicine list with you in case of an emergency  Symptom relief: The following may help relieve cold symptoms, such as a dry throat and congestion:  · Gargle with mouthwash or warm salt water as directed  · Suck on throat lozenges or hard candy  · Use a cold or warm vaporizer or humidifier to ease your breathing  · Rest for at least 2 days and then as needed to decrease tiredness and weakness  · Use petroleum based jelly around your nostrils to decrease irritation from blowing your nose  Drink liquids:  Liquids will help thin and loosen thick mucus so you can cough it up  Liquids will also keep you hydrated  Ask your healthcare provider which liquids are best for you and how much to drink each day  Prevent the spread of germs: You can spread your cold germs to others for at least 3 days after your symptoms start  Wash your hands often  Do not share items, such as eating utensils  Cover your nose and mouth when you cough or sneeze using the crook of your elbow instead of your hands  Throw used tissues in the garbage  Do not smoke:  Smoking may worsen your symptoms and increase the length of time you feel sick  Talk with your healthcare provider if you need help to stop smoking  Follow up with your doctor as directed:  Write down your questions so you remember to ask them during your visits  © Copyright [employer] 2021 Information is for End User's use only and may not be sold, redistributed or otherwise used for commercial purposes   All illustrations and images included in CareNotes® are the copyrighted property of A [employer] M [employer] Inc  or [facility]  The above information is an  only  It is not intended as medical advice for individual conditions or treatments  Talk to your doctor, nurse or pharmacist before following any medical regimen to see if it is safe and effective for you

## 2022-01-13 LAB — SARS-COV-2 RNA RESP QL NAA+PROBE: NEGATIVE

## 2022-04-05 ENCOUNTER — APPOINTMENT (EMERGENCY)
Dept: RADIOLOGY | Facility: HOSPITAL | Age: 37
End: 2022-04-05
Payer: COMMERCIAL

## 2022-04-05 ENCOUNTER — HOSPITAL ENCOUNTER (EMERGENCY)
Facility: HOSPITAL | Age: 37
Discharge: HOME | End: 2022-04-05
Attending: EMERGENCY MEDICINE | Admitting: EMERGENCY MEDICINE
Payer: COMMERCIAL

## 2022-04-05 VITALS
TEMPERATURE: 97.4 F | WEIGHT: 167.55 LBS | BODY MASS INDEX: 26.24 KG/M2 | SYSTOLIC BLOOD PRESSURE: 126 MMHG | OXYGEN SATURATION: 96 % | DIASTOLIC BLOOD PRESSURE: 84 MMHG | HEART RATE: 85 BPM | RESPIRATION RATE: 18 BRPM

## 2022-04-05 DIAGNOSIS — M25.562 LEFT KNEE PAIN: Primary | ICD-10-CM

## 2022-04-05 PROCEDURE — 99284 EMERGENCY DEPT VISIT MOD MDM: CPT | Performed by: PHYSICIAN ASSISTANT

## 2022-04-05 PROCEDURE — 99283 EMERGENCY DEPT VISIT LOW MDM: CPT

## 2022-04-05 PROCEDURE — 73564 X-RAY EXAM KNEE 4 OR MORE: CPT

## 2022-04-05 RX ORDER — NAPROXEN 500 MG/1
500 TABLET ORAL 2 TIMES DAILY WITH MEALS
Qty: 30 TABLET | Refills: 0 | Status: SHIPPED | OUTPATIENT
Start: 2022-04-05 | End: 2022-08-10 | Stop reason: ALTCHOICE

## 2022-04-05 NOTE — ED PROVIDER NOTES
History  Chief Complaint   Patient presents with    Knee Pain     Pt  reports left knee pain from a fall three weeks prior onto a marble floor  Pt  reports her pain as a 8 without OTC pain management  Pt  reports swelling and brusing has subsided  37y  o female with PMH of asthma presents to the ER for left knee pain for 3 weeks  Patient states she slipped on a marble floor and landed directly on her knee  She has been taking Tylenol for pain with some relief  She describes her pain as sharp and non-radiating  Pain is constant but worse with certain movements  Patient states the area was bruised and swollen but that has resolved  She denies fever, chills, URI symptoms, chest pain, dyspnea, N/V/D, abdominal pain, weakness or paresthesias  History provided by:  Patient   used: No        Prior to Admission Medications   Prescriptions Last Dose Informant Patient Reported? Taking?    albuterol (5 mg/mL) 0 5 % nebulizer solution   No No   Sig: Take 0 5 mL (2 5 mg total) by nebulization every 6 (six) hours as needed for wheezing   albuterol (PROVENTIL HFA,VENTOLIN HFA) 90 mcg/act inhaler   No No   Sig: Inhale 2 puffs every 4 (four) hours as needed for wheezing   benzonatate (TESSALON PERLES) 100 mg capsule   No No   Sig: Take 1 capsule (100 mg total) by mouth every 8 (eight) hours   Patient not taking: Reported on 2022    dextromethorphan-guaifenesin (MUCINEX DM)  MG per 12 hr tablet   No No   Sig: Take 1 tablet by mouth every 12 (twelve) hours   Patient not taking: Reported on 2022    dicyclomine (BENTYL) 20 mg tablet   No No   Sig: Take 1 tablet (20 mg total) by mouth 2 (two) times a day   fluticasone (FLONASE) 50 mcg/act nasal spray   No No   Si spray into each nostril daily   fluticasone (FLONASE) 50 mcg/act nasal spray   No No   Si spray into each nostril daily   guaiFENesin (MUCINEX) 600 mg 12 hr tablet   No No   Sig: Take 2 tablets (1,200 mg total) by mouth every 12 (twelve) hours   Patient not taking: Reported on 1/12/2022    loratadine (CLARITIN) 10 mg tablet   No No   Sig: Take 1 tablet (10 mg total) by mouth daily   ondansetron (ZOFRAN-ODT) 4 mg disintegrating tablet   No No   Sig: Take 1 tablet (4 mg total) by mouth every 6 (six) hours as needed for nausea or vomiting   Patient not taking: Reported on 1/12/2022       Facility-Administered Medications: None       Past Medical History:   Diagnosis Date    Asthma     No known problems        History reviewed  No pertinent surgical history  History reviewed  No pertinent family history  I have reviewed and agree with the history as documented  E-Cigarette/Vaping    E-Cigarette Use Never User      E-Cigarette/Vaping Substances     Social History     Tobacco Use    Smoking status: Never Smoker    Smokeless tobacco: Never Used   Vaping Use    Vaping Use: Never used   Substance Use Topics    Alcohol use: Never    Drug use: Never       Review of Systems   Constitutional: Negative for activity change, appetite change, chills and fever  HENT: Negative for congestion, drooling, ear discharge, ear pain, facial swelling, rhinorrhea and sore throat  Eyes: Negative for redness  Respiratory: Negative for cough and shortness of breath  Cardiovascular: Negative for chest pain  Gastrointestinal: Negative for abdominal pain, diarrhea, nausea and vomiting  Musculoskeletal: Negative for neck stiffness  Skin: Negative for rash  Allergic/Immunologic: Negative for food allergies  Neurological: Negative for weakness and numbness  Physical Exam  Physical Exam  Vitals and nursing note reviewed  Constitutional:       General: She is not in acute distress  Appearance: She is not toxic-appearing  HENT:      Head: Normocephalic and atraumatic  Eyes:      Conjunctiva/sclera: Conjunctivae normal    Neck:      Trachea: No tracheal deviation  Cardiovascular:      Rate and Rhythm: Normal rate  Pulmonary:      Effort: Pulmonary effort is normal  No respiratory distress  Abdominal:      General: There is no distension  Musculoskeletal:      Cervical back: Normal range of motion and neck supple  Left upper leg: Normal       Left knee: No swelling, deformity, effusion, erythema, ecchymosis, lacerations, bony tenderness or crepitus  Normal range of motion  Tenderness present over the LCL  No LCL laxity, MCL laxity, ACL laxity or PCL laxity  Normal alignment  Normal pulse  Left lower leg: Normal         Legs:    Skin:     General: Skin is warm and dry  Findings: No rash  Neurological:      Mental Status: She is alert  GCS: GCS eye subscore is 4  GCS verbal subscore is 5  GCS motor subscore is 6  Psychiatric:         Mood and Affect: Mood normal          Vital Signs  ED Triage Vitals   Temperature Pulse Respirations Blood Pressure SpO2   04/05/22 1012 04/05/22 1008 04/05/22 1008 04/05/22 1008 04/05/22 1008   (!) 97 4 °F (36 3 °C) 85 18 126/84 96 %      Temp Source Heart Rate Source Patient Position - Orthostatic VS BP Location FiO2 (%)   04/05/22 1012 04/05/22 1008 04/05/22 1008 04/05/22 1008 --   Oral Monitor Sitting Right arm       Pain Score       04/05/22 1008       8           Vitals:    04/05/22 1008   BP: 126/84   Pulse: 85   Patient Position - Orthostatic VS: Sitting         Visual Acuity      ED Medications  Medications - No data to display    Diagnostic Studies  Results Reviewed     None                 XR knee 4+ views LEFT   Final Result by Brandin Ellsworth MD (04/05 1209)      No acute osseous abnormality              Workstation performed: [id number]                    Procedures  Orthopedic injury treatment    Date/Time: 4/5/2022 12:16 PM  Performed by: Ericka Gutierres PA-C  Authorized by: Ericka Gutierres PA-C     Patient Location:  ED  Other Assisting Provider: Yes (comment) (ED RN)    Verbal consent obtained?: Yes    Consent given by:  Patient  Patient states understanding of procedure being performed: Yes    Radiology Images displayed and confirmed  If images not available, report reviewed: Yes    Patient identity confirmed:  Arm band  Injury location:  Knee  Location details:  Left knee  Injury type: Soft tissue  Neurovascular status: Neurovascularly intact    Distal perfusion: normal    Neurological function: normal    Range of motion: normal    Local anesthesia used?: No    General anesthesia used?: No    Skeletal traction used?: No    Immobilization:  Knee immobilizer  Neurovascular status: Neurovascularly intact    Distal perfusion: normal    Neurological function: normal    Range of motion: normal    Patient tolerance:  Patient tolerated the procedure well with no immediate complications             ED Course                                             MDM  Number of Diagnoses or Management Options  Left knee pain: new and requires workup  Diagnosis management comments: DDX consists of but not limited to: fracture, contusion, sprain, ligament injury, meniscus injury    Will check imaging  Informed patient I did not see any acute abnormalities on xray at this time and if the radiologist saw anything concerning when reading the xray, we would call to inform them  Patient agreeable  The management plan was discussed in detail with the patient at bedside and all questions were answered  Prior to discharge, we provided both verbal and written instructions  We discussed with the patient the signs and symptoms for which to return to the emergency department  All questions were answered and patient was comfortable with the plan of care and discharged to home  Instructed the patient to follow up with the primary care provider and/or specialist provided and their written instructions  The patient verbalized understanding of our discussion and plan of care, and agrees to return to the Emergency Department for concerns and progression of illness      At discharge, I instructed the patient to:  -follow up with pcp  -take Naproxen as prescribed  -follow up with the recommended orthopedic specialist  -rest, ice and elevate knee  -return to the ER if symptoms worsened or new symptoms arose  Patient agreed to this plan and was stable at time of discharge  Amount and/or Complexity of Data Reviewed  Tests in the radiology section of CPT®: ordered and reviewed  Independent visualization of images, tracings, or specimens: yes    Patient Progress  Patient progress: stable      Disposition  Final diagnoses:   Left knee pain     Time reflects when diagnosis was documented in both MDM as applicable and the Disposition within this note     Time User Action Codes Description Comment    4/5/2022 12:16 PM Delores GAMEZ Add [S24 615] Left knee pain       ED Disposition     ED Disposition Condition Date/Time Comment    Discharge Stable Tue Apr 5, 2022 12:16 PM John Gutierres discharge to home/self care              Follow-up Information     Follow up With Specialties Details Why Contact Info Additional [id number] [facility] Schedule an appointment as soon as possible for a visit  As needed Kosta Starks 36  [facility] 85 Orthopedic Surgery Schedule an appointment as soon as possible for a visit in 1 day  [address] [facility] [id number]  [address] [facility], [address] [place] [address], [address], [address] [phone]          Discharge Medication List as of 4/5/2022 12:17 PM      START taking these medications    Details   naproxen (NAPROSYN) 500 mg tablet Take 1 tablet (500 mg total) by mouth 2 (two) times a day with meals, Starting Tue 4/5/2022, Normal         CONTINUE these medications which have NOT CHANGED    Details   albuterol (5 mg/mL) 0 5 % nebulizer solution Take 0 5 mL (2 5 mg total) by nebulization every 6 (six) hours as needed for wheezing, Starting Tue 11/30/2021, Normal      albuterol (PROVENTIL HFA,VENTOLIN HFA) 90 mcg/act inhaler Inhale 2 puffs every 4 (four) hours as needed for wheezing, Starting Tue 11/30/2021, Normal      benzonatate (TESSALON PERLES) 100 mg capsule Take 1 capsule (100 mg total) by mouth every 8 (eight) hours, Starting Fri 11/15/2019, Print      dextromethorphan-guaifenesin ([place] 598 DM)  MG per 12 hr tablet Take 1 tablet by mouth every 12 (twelve) hours, Starting Tue 10/26/2021, Normal      dicyclomine (BENTYL) 20 mg tablet Take 1 tablet (20 mg total) by mouth 2 (two) times a day, Starting Mon 6/7/2021, Print      !! fluticasone (FLONASE) 50 mcg/act nasal spray 1 spray into each nostril daily, Starting Fri 11/15/2019, Print      !! fluticasone (FLONASE) 50 mcg/act nasal spray 1 spray into each nostril daily, Starting Tue 10/26/2021, Normal      guaiFENesin (MUCINEX) 600 mg 12 hr tablet Take 2 tablets (1,200 mg total) by mouth every 12 (twelve) hours, Starting Fri 11/15/2019, Print      loratadine (CLARITIN) 10 mg tablet Take 1 tablet (10 mg total) by mouth daily, Starting Tue 10/26/2021, Normal      ondansetron (ZOFRAN-ODT) 4 mg disintegrating tablet Take 1 tablet (4 mg total) by mouth every 6 (six) hours as needed for nausea or vomiting, Starting Mon 6/7/2021, Print       !! - Potential duplicate medications found  Please discuss with provider  No discharge procedures on file      PDMP Review     None          ED Provider  Electronically Signed by           Esperanza Alatorre PA-C  04/05/22 8700

## 2022-04-05 NOTE — DISCHARGE INSTRUCTIONS
DISCHARGE INSTRUCTIONS:    FOLLOW UP WITH YOUR PRIMARY CARE PROVIDER OR THE [address] [place] [address]  MAKE AN APPOINTMENT TO BE SEEN  TAKE MEDICATION AS PRESCRIBED  IF RASH, SHORTNESS OF BREATH OR TROUBLE SWALLOWING, STOP TAKING THE MEDICATION AND BE SEEN  FOLLOW UP WITH THE RECOMMENDED ORTHOPEDIC SPECIALIST  MAKE AN APPOINTMENT TO BE SEEN  REST, ICE AND ELEVATE THE AREA  IF SYMPTOMS WORSEN OR NEW SYMPTOMS ARISE, RETURN TO THE ER TO BE SEEN

## 2022-04-05 NOTE — Clinical Note
Alicia Cordoba was seen and treated in our emergency department on 4/5/2022  Diagnosis:     Dahlia    She may return on this date: 04/06/2022         If you have any questions or concerns, please don't hesitate to call        Gwen Jacome PA-C    ______________________________           _______________          _______________  Hospital Representative                              Date                                Time

## 2022-04-06 ENCOUNTER — OFFICE VISIT (OUTPATIENT)
Dept: OBGYN CLINIC | Facility: MEDICAL CENTER | Age: 37
End: 2022-04-06
Payer: COMMERCIAL

## 2022-04-06 ENCOUNTER — APPOINTMENT (OUTPATIENT)
Dept: RADIOLOGY | Facility: MEDICAL CENTER | Age: 37
End: 2022-04-06
Payer: COMMERCIAL

## 2022-04-06 VITALS
SYSTOLIC BLOOD PRESSURE: 116 MMHG | BODY MASS INDEX: 26.21 KG/M2 | HEART RATE: 76 BPM | WEIGHT: 167 LBS | DIASTOLIC BLOOD PRESSURE: 75 MMHG | HEIGHT: 67 IN

## 2022-04-06 DIAGNOSIS — M25.562 ACUTE PAIN OF LEFT KNEE: ICD-10-CM
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
## 2022-04-06 DIAGNOSIS — M22.2X2 PATELLOFEMORAL PAIN SYNDROME OF LEFT KNEE: ICD-10-CM

## 2022-04-06 DIAGNOSIS — S80.02XA CONTUSION OF LEFT KNEE, INITIAL ENCOUNTER: ICD-10-CM

## 2022-04-06 DIAGNOSIS — M25.562 ACUTE PAIN OF LEFT KNEE: Primary | ICD-10-CM

## 2022-04-06 PROCEDURE — 99204 OFFICE O/P NEW MOD 45 MIN: CPT | Performed by: STUDENT IN AN ORGANIZED HEALTH CARE EDUCATION/TRAINING PROGRAM

## 2022-04-06 PROCEDURE — 73560 X-RAY EXAM OF KNEE 1 OR 2: CPT

## 2022-04-06 NOTE — PATIENT INSTRUCTIONS
Take naproxen 500mg every 12 hours consistently for five days, then only take as need afterwards  Follow up with physical therapy to help with recovery  Use knee brace only during work/strenuous activities

## 2022-04-06 NOTE — PROGRESS NOTES
1  Acute pain of left knee  XR knee 1 or 2 vw left    Brace    Ambulatory Referral to Physical Therapy   2  Fall, initial encounter  XR knee 1 or 2 vw left    Brace    Ambulatory Referral to Physical Therapy   3  Contusion of left knee, initial encounter  Brace    Ambulatory Referral to Physical Therapy   4  Patellofemoral pain syndrome of left knee  Brace    Ambulatory Referral to Physical Therapy     Orders Placed This Encounter   Procedures    Brace    XR knee 1 or 2 vw left    Ambulatory Referral to Physical Therapy        Imaging Studies (I personally reviewed images in PACS):     X-ray left knee one view (sunrise) 4/6/2022:  Acute osseous abnormalities or significant degenerative changes   X-ray left knee 04/05/2022:  No acute osseous abnormalities or significant degenerative changes  IMPRESSION:   Acute left knee pain status post fall and direct impact on anterior flexed knee   Knee contusion injury (differential includes bone bruise)   Patellofemoral knee pain syndrome secondary to contusion    PLAN:     Clinical exam and radiographic imaging reviewed with patient today, with impression as per above  I have discussed with the patient the pathophysiology of this diagnosis and reviewed how the examination correlates with this diagnosis   I ordered a one view sunrise today as interpreted above  I also reviewed prior imaging on today's imaging with patient as noted above  Will follow-up official radiology interpretation   I recommended conservative treatment at this time with formal physical therapy   I recommended she  the prescription from the ER in regards to naproxen 500 mg  I recommended she take naproxen b i d  Consistently for the next 5 days, then only as needed afterwards   I have also prescribed her a Terry Pull knee brace to be used during strenuous activity/work as needed   Work note provided today a larger use the brace as needed as per communications      Return in about 4 weeks (around 5/4/2022)  Portions of the record may have been created with voice recognition software  Occasional wrong word or "sound a like" substitutions may have occurred due to the inherent limitations of voice recognition software  Read the chart carefully and recognize, using context, where substitutions have occurred  CHIEF COMPLAINT:  Chief Complaint   Patient presents with    Left Knee - Pain         HPI:  Jj Marquez is a 30 y o  female  who presents for       Visit 4/6/2022:  Initial evaluation of left knee pain/injury:  Reportedly had an injury about 3 weeks ago after slipping and falling onto her anterior flexed left knee on a marble floor  She states she had persistent pain for 3 weeks which prompted her to go to the ER on 04/05/2022 and had imaging done as noted above  She states there was initial swelling and bruising which has resolved  In the ER she was placed in a knee immobilizer but patient comes in today without wearing it  She concurs with the ER note about falling on her flexed knee about 3 weeks ago onto a marble floor  She states there was swelling and bruising firs but this progressively resolved  Her main concern is that she continues to have pain over the anteromedial aspect of her knee she describes as an aching pain mild to moderate intensity  She states the pain is mostly aggravated with transitioning from sitting to standing and with prolonged sitting in general   She also feels she has difficulty with squatting  She reports a grinding sensation within her knee as well  She denies a sensation of giving out or locking in extension  She also states when the pain is aggravated, there is a sensation of numbness/tingling over the anteromedial aspect of her knee  She states she did not  the naproxen from the ER yet  She has not seen therapy for this issue before    She denies any prior injury/surgeries of her knee in the past   She has not been using any knee brace  Denies pain wakes her at night  Medical, Surgical, Family, and Social History    Past Medical History:   Diagnosis Date    Asthma     No known problems      History reviewed  No pertinent surgical history  Social History   Social History     Substance and Sexual Activity   Alcohol Use Never     Social History     Substance and Sexual Activity   Drug Use Never     Social History     Tobacco Use   Smoking Status Never Smoker   Smokeless Tobacco Never Used     History reviewed  No pertinent family history  Allergies   Allergen Reactions    Shellfish-Derived Products - Food Allergy Hives          Physical Exam  /75   Pulse 76   Ht 5' 7" (1 702 m)   Wt 75 8 kg (167 lb)   LMP 03/23/2022 (Approximate)   BMI 26 16 kg/m²     Constitutional:  see vital signs  Gen: well-developed, normocephalic/atraumatic, well-groomed  Eyes: No inflammation or discharge of conjunctiva or lids; sclera clear   Pharynx: no inflammation, lesion, or mass of lips  Neck: supple, no masses, non-distended  MSK: no inflammation, lesion, mass, or clubbing of nails and digits except for other than mentioned below  SKIN: no visible rashes or skin lesions  Pulmonary/Chest: Effort normal  No respiratory distress     NEURO: cranial nerves grossly intact  PSYCH:  Alert and oriented to person, place, and time; recent and remote memory intact; mood normal, no depression, anxiety, or agitation, judgment and insight good and intact     Ortho Exam  Left Knee Exam:  Erythema: no  Swelling: no  Increased Warmth: no  Tenderness: +anteromedial patellofemoral joint line  ROM: 0-130  Patellar Apprehension: negative  Patellar Grind: +  Varus laxity: negative  Valgus laxity: negative  Bisi: negative   Thessaly Test: negative    Sensation intact to light touch      Left hip ROM demonstrates no pain actively or passively    Gait: normal without antalgia

## 2022-04-06 NOTE — LETTER
April 6, 2022     Patient: Jus Perkins   YOB: 1985   Date of Visit: 4/6/2022       To Whom it May Concern:    Jus Perkins is under my professional care  She was seen in my office on 4/6/2022  She can return to work without restrictions  However, please allow her to wear a left knee brace if needed  She will be following up in approximately 4 weeks  If you have any questions or concerns, please don't hesitate to call           Sincerely,          Margaret Prado MD        CC: Jus Perkins

## 2022-06-20 ENCOUNTER — HOSPITAL ENCOUNTER (EMERGENCY)
Facility: HOSPITAL | Age: 37
Discharge: HOME | End: 2022-06-20
Attending: EMERGENCY MEDICINE
Payer: COMMERCIAL

## 2022-06-20 VITALS
RESPIRATION RATE: 20 BRPM | DIASTOLIC BLOOD PRESSURE: 90 MMHG | TEMPERATURE: 98.3 F | OXYGEN SATURATION: 99 % | HEART RATE: 65 BPM | SYSTOLIC BLOOD PRESSURE: 121 MMHG

## 2022-06-20 DIAGNOSIS — T78.40XA ALLERGY, INITIAL ENCOUNTER: ICD-10-CM

## 2022-06-20 DIAGNOSIS — R09.81 NASAL CONGESTION: Primary | ICD-10-CM

## 2022-06-20 LAB
ATRIAL RATE: 61 BPM
P AXIS: -1 DEGREES
PR INTERVAL: 114 MS
QRS AXIS: 88 DEGREES
QRSD INTERVAL: 94 MS
QT INTERVAL: 394 MS
QTC INTERVAL: 396 MS
T WAVE AXIS: 44 DEGREES
VENTRICULAR RATE: 61 BPM

## 2022-06-20 PROCEDURE — 99284 EMERGENCY DEPT VISIT MOD MDM: CPT

## 2022-06-20 PROCEDURE — 93005 ELECTROCARDIOGRAM TRACING: CPT

## 2022-06-20 PROCEDURE — 93010 ELECTROCARDIOGRAM REPORT: CPT | Performed by: INTERNAL MEDICINE

## 2022-06-20 PROCEDURE — 99284 EMERGENCY DEPT VISIT MOD MDM: CPT | Performed by: EMERGENCY MEDICINE

## 2022-06-20 NOTE — ED PROVIDER NOTES
History  Chief Complaint   Patient presents with    Chest Pain     Pt reports chest pain that started this weekend; pr reports increased anxiety and denies cardiac hx     29-year-old female presents to the emergency department for evaluation of allergy symptoms  The patient states that over the past month she has had watery eyes nasal congestion runny nose and intermittent cough  She states that she was having difficulty sleeping last night because of her symptoms  She states that she also has asthma and felt like she was wheezing earlier today  The patient states that she used her nebulizer earlier today which she says did help her symptoms  The patient states that she has been taking over-the-counter medications including Allegra and Claritin but does not feel like they have been working  The patient also states that she was feeling anxious because the medications have not been working so came to the emergency department for further evaluation  The triage note states that the patient was having chest pain but the patient denies this  She also denies fevers, chills, nausea, vomiting, diarrhea and shortness of breath  Prior to Admission Medications   Prescriptions Last Dose Informant Patient Reported? Taking?    albuterol (5 mg/mL) 0 5 % nebulizer solution   No No   Sig: Take 0 5 mL (2 5 mg total) by nebulization every 6 (six) hours as needed for wheezing   albuterol (PROVENTIL HFA,VENTOLIN HFA) 90 mcg/act inhaler   No No   Sig: Inhale 2 puffs every 4 (four) hours as needed for wheezing   benzonatate (TESSALON PERLES) 100 mg capsule   No No   Sig: Take 1 capsule (100 mg total) by mouth every 8 (eight) hours   Patient not taking: Reported on 1/12/2022    dextromethorphan-guaifenesin (MUCINEX DM)  MG per 12 hr tablet   No No   Sig: Take 1 tablet by mouth every 12 (twelve) hours   Patient not taking: Reported on 1/12/2022    dicyclomine (BENTYL) 20 mg tablet   No No   Sig: Take 1 tablet (20 mg total) by mouth 2 (two) times a day   fluticasone (FLONASE) 50 mcg/act nasal spray   No No   Si spray into each nostril daily   fluticasone (FLONASE) 50 mcg/act nasal spray   No Yes   Si spray into each nostril daily   guaiFENesin (MUCINEX) 600 mg 12 hr tablet   No No   Sig: Take 2 tablets (1,200 mg total) by mouth every 12 (twelve) hours   Patient not taking: Reported on 2022    loratadine (CLARITIN) 10 mg tablet   No Yes   Sig: Take 1 tablet (10 mg total) by mouth daily   naproxen (NAPROSYN) 500 mg tablet   No No   Sig: Take 1 tablet (500 mg total) by mouth 2 (two) times a day with meals   ondansetron (ZOFRAN-ODT) 4 mg disintegrating tablet   No No   Sig: Take 1 tablet (4 mg total) by mouth every 6 (six) hours as needed for nausea or vomiting   Patient not taking: Reported on 2022       Facility-Administered Medications: None       Past Medical History:   Diagnosis Date    Asthma     No known problems        History reviewed  No pertinent surgical history  History reviewed  No pertinent family history  I have reviewed and agree with the history as documented  E-Cigarette/Vaping    E-Cigarette Use Never User      E-Cigarette/Vaping Substances    Nicotine No     THC No     CBD No     Flavoring No     Other No     Unknown No      Social History     Tobacco Use    Smoking status: Never Smoker    Smokeless tobacco: Never Used   Vaping Use    Vaping Use: Never used   Substance Use Topics    Alcohol use: Never    Drug use: Never        Review of Systems   Constitutional: Negative for chills and fever  HENT: Positive for congestion, postnasal drip and rhinorrhea  Negative for ear pain and sore throat  Eyes: Negative for pain and visual disturbance  Respiratory: Negative for cough and shortness of breath  Cardiovascular: Negative for chest pain and palpitations  Gastrointestinal: Negative for abdominal pain and vomiting  Genitourinary: Negative for dysuria and hematuria  Musculoskeletal: Negative for arthralgias and back pain  Skin: Negative for color change and rash  Neurological: Negative for seizures and syncope  Psychiatric/Behavioral: The patient is nervous/anxious  All other systems reviewed and are negative  Physical Exam  ED Triage Vitals   Temperature Pulse Respirations Blood Pressure SpO2   06/20/22 1100 06/20/22 1025 06/20/22 1025 06/20/22 1025 06/20/22 1025   98 3 °F (36 8 °C) 65 20 121/90 99 %      Temp Source Heart Rate Source Patient Position - Orthostatic VS BP Location FiO2 (%)   06/20/22 1100 -- -- -- --   Oral          Pain Score       --                    Orthostatic Vital Signs  Vitals:    06/20/22 1025   BP: 121/90   Pulse: 65       Physical Exam  Vitals and nursing note reviewed  Constitutional:       General: She is not in acute distress  Appearance: She is well-developed  HENT:      Head: Normocephalic and atraumatic  Nose: Congestion present  Eyes:      Conjunctiva/sclera: Conjunctivae normal    Cardiovascular:      Rate and Rhythm: Normal rate and regular rhythm  Heart sounds: No murmur heard  Pulmonary:      Effort: Pulmonary effort is normal  No respiratory distress  Breath sounds: Normal breath sounds  Abdominal:      Palpations: Abdomen is soft  Tenderness: There is no abdominal tenderness  Musculoskeletal:      Cervical back: Neck supple  Skin:     General: Skin is warm and dry  Neurological:      Mental Status: She is alert           ED Medications  Medications - No data to display    Diagnostic Studies  Results Reviewed     None                 No orders to display         Procedures  ECG 12 Lead Documentation Only    Date/Time: 6/20/2022 11:00 AM  Performed by: Ade Bueno MD  Authorized by: Ade Bueno MD     ECG reviewed by me, the ED Provider: yes    Patient location:  ED  Previous ECG:     Previous ECG:  Unavailable    Comparison to cardiac monitor: Yes    Interpretation: Interpretation: normal    Rate:     ECG rate assessment: normal    Rhythm:     Rhythm: sinus rhythm    Ectopy:     Ectopy: none    QRS:     QRS axis:  Normal  Conduction:     Conduction: normal    ST segments:     ST segments:  Normal  T waves:     T waves: normal            ED Course                     PERC Rule for PE    Flowsheet Row Most Recent Value   PERC Rule for PE    Age >=50 0 Filed at: 06/20/2022 1123   HR >=100 0 Filed at: 06/20/2022 1123   O2 Sat on room air < 95% 0 Filed at: 06/20/2022 1123   History of PE or DVT 0 Filed at: 06/20/2022 1123   Recent trauma or surgery 0 Filed at: 06/20/2022 1123   Hemoptysis 0 Filed at: 06/20/2022 1123   Exogenous estrogen 0 Filed at: 06/20/2022 1123   Unilateral leg swelling 0 Filed at: 06/20/2022 1123   PERC Rule for PE Results 0 Filed at: 06/20/2022 1123              SBIRT 20yo+    Flowsheet Row Most Recent Value   SBIRT (23 yo +)    In order to provide better care to our patients, we are screening all of our patients for alcohol and drug use  Would it be okay to ask you these screening questions? No Filed at: 06/20/2022 1055          Wells' Criteria for PE    Flowsheet Row Most Recent Value   Wells' Criteria for PE    Clinical signs and symptoms of DVT 0 Filed at: 06/20/2022 1123   PE is primary diagnosis or equally likely 0 Filed at: 06/20/2022 1123   HR >100 0 Filed at: 06/20/2022 1123   Immobilization at least 3 days or Surgery in the previous 4 weeks 0 Filed at: 06/20/2022 1123   Previous, objectively diagnosed PE or DVT 0 Filed at: 06/20/2022 1123   Hemoptysis 0 Filed at: 06/20/2022 1123   Malignancy with treatment within 6 months or palliative 0 Filed at: 06/20/2022 1123   Wells' Criteria Total 0 Filed at: 06/20/2022 1123            MDM  Number of Diagnoses or Management Options  Allergy, initial encounter  Nasal congestion  Diagnosis management comments: 53-year-old female presented to the emergency department for evaluation of worsening allergies    On arrival the patient was awake, alert, oriented and in no acute distress  Initial vital signs within normal limits  EKG within normal limits  Medication compliance and treatment options were discussed with the patient  She is appropriate for discharge at this time with recommendation to establish care with a PCP and follow up for continued symptoms  Return precautions were discussed  Patient agrees with the plan for discharge and feels comfortable to go home with proper f/u  Advised to return for worsening or additional problems  Diagnostic tests were reviewed and questions answered  Diagnosis, care plan and treatment options were discussed  The patient understands instructions and will follow up as directed  Disposition  Final diagnoses:   Nasal congestion   Allergy, initial encounter     Time reflects when diagnosis was documented in both MDM as applicable and the Disposition within this note     Time User Action Codes Description Comment    6/20/2022 11:19 AM Starling Melter Add [R07 89] Atypical chest pain     6/20/2022 11:19 AM Starling Melter Add [R09 81] Nasal congestion     6/20/2022 11:22 AM Starling Melter Modify [R09 81] Nasal congestion     6/20/2022 11:22 AM Starling Melter Remove [R07 89] Atypical chest pain     6/20/2022 11:22 AM Starling Melter Add [T78 40XA] Allergy, initial encounter       ED Disposition     ED Disposition   Discharge    Condition   Stable    Date/Time   Mon Jun 20, 2022 11:24 AM    Comment   Christo Rizzo discharge to home/self care                 Follow-up Information     Follow up With Specialties Details Why Contact Info Additional [address] [facility] Schedule an appointment as soon as possible for a visit   [address], [address] [facility] [address]  [address] [place] [address], [address], [address] [place], [place], [address] [phone]    [employer]  Call   [phone]             Discharge Medication List as of 6/20/2022 11:24 AM      CONTINUE these medications which have NOT CHANGED    Details   !! fluticasone (FLONASE) 50 mcg/act nasal spray 1 spray into each nostril daily, Starting Tue 10/26/2021, Normal      loratadine (CLARITIN) 10 mg tablet Take 1 tablet (10 mg total) by mouth daily, Starting Tue 10/26/2021, Normal      albuterol (5 mg/mL) 0 5 % nebulizer solution Take 0 5 mL (2 5 mg total) by nebulization every 6 (six) hours as needed for wheezing, Starting Tue 11/30/2021, Normal      albuterol (PROVENTIL HFA,VENTOLIN HFA) 90 mcg/act inhaler Inhale 2 puffs every 4 (four) hours as needed for wheezing, Starting Tue 11/30/2021, Normal      benzonatate (TESSALON PERLES) 100 mg capsule Take 1 capsule (100 mg total) by mouth every 8 (eight) hours, Starting Fri 11/15/2019, Print      dextromethorphan-guaifenesin (Orene Mindy DM)  MG per 12 hr tablet Take 1 tablet by mouth every 12 (twelve) hours, Starting Tue 10/26/2021, Normal      dicyclomine (BENTYL) 20 mg tablet Take 1 tablet (20 mg total) by mouth 2 (two) times a day, Starting Mon 6/7/2021, Print      !! fluticasone (FLONASE) 50 mcg/act nasal spray 1 spray into each nostril daily, Starting Fri 11/15/2019, Print      guaiFENesin (MUCINEX) 600 mg 12 hr tablet Take 2 tablets (1,200 mg total) by mouth every 12 (twelve) hours, Starting Fri 11/15/2019, Print      naproxen (NAPROSYN) 500 mg tablet Take 1 tablet (500 mg total) by mouth 2 (two) times a day with meals, Starting Tue 4/5/2022, Normal      ondansetron (ZOFRAN-ODT) 4 mg disintegrating tablet Take 1 tablet (4 mg total) by mouth every 6 (six) hours as needed for nausea or vomiting, Starting Mon 6/7/2021, Print       !! - Potential duplicate medications found  Please discuss with provider  PDMP Review     None           ED Provider  Attending physically available and evaluated Christian Ovalle   I managed the patient along with the ED Attending      Electronically Signed by         Dyana Moran MD  06/20/22 8562

## 2022-06-20 NOTE — Clinical Note
Ju Wu was seen and treated in our emergency department on 6/20/2022  No restrictions            Diagnosis: Medical problem    Sherif Knight  may return to work on return date  She may return on this date: 06/21/2022         If you have any questions or concerns, please don't hesitate to call        Christiana Falcon MD    ______________________________           _______________          _______________  Hospital Representative                              Date                                Time

## 2022-06-20 NOTE — ED NOTES
Pt reports that she has chest pain and she does not know if it is from allergies or anxiety     Guille Merritt RN  06/20/22 4207

## 2022-06-20 NOTE — ED ATTENDING ATTESTATION
6/20/2022  IWilmer DO, saw and evaluated the patient  I have discussed the patient with the resident/non-physician practitioner and agree with the resident's/non-physician practitioner's findings, Plan of Care, and MDM as documented in the resident's/non-physician practitioner's note, except where noted  All available labs and Radiology studies were reviewed  I was present for key portions of any procedure(s) performed by the resident/non-physician practitioner and I was immediately available to provide assistance  At this point I agree with the current assessment done in the Emergency Department  I have conducted an independent evaluation of this patient a history and physical is as follows:    Patient is a 53-year-old female who says last month and half she has noticed allergy symptoms including some runny nose, watery eyes, and they will sometimes give her some cough and make her asthma bit worse  She says normally they are somewhat well controlled with over-the-counter medications such as Claritin or Allegra but over the last week she has noticed that it has not been controlling it as well  She has been taking these daily medications a little more often, switching around and not taking a consistent medication  She says that she does have a nebulizer which she will use for her asthma when she feels like she is wheezing or coughing a little bit  Is not on any daily controlling medications  Despite nurse's notes she has no chest pain, no fever, no chills, no travel history, no sick contacts  She does say she is little bit anxious about the fact that her symptoms have not been controlled well with her over-the-counter medications  Patient denies any prolonged travel history, no recent long travel, no immobilizations, or hospitalizations      General:  Patient is well-appearing  Head:  Atraumatic  Eyes:  Conjunctiva pink, no purulent drainage or discharge, no evidence of conjunctivitis  ENT: Mucous membranes are moist, slight clear rhinorrhea, no septal hematoma  No oropharyngeal lesions  Neck:  Supple  Cardiac:  S1-S2, without murmurs  Lungs:  Clear to auscultation bilaterally  Abdomen:  Soft, nontender, normal bowel sounds, no CVA tenderness, no tympany, no rigidity, no guarding  Extremities:  Normal range of motion, no pedal edema or calf asymmetry, radial pulses are equal and symmetric bilaterally  Neurologic:  Awake, fluent speech, normal comprehension, AAOx3  Skin:  Pink warm and dry  Psychiatric:  Alert, pleasant, cooperative      ED Course  ED Course as of 06/20/22 1122   Mon Jun 20, 2022   1113 ECG performed by nursing staff prior to my assessment, interpreted by me NSR 61, NAD, nl axis, no ischemic or infarctive changes, no old available for comparisson     Patient not having any chest pain, sounds like she has seasonal allergy like symptoms  Do not believe additional cardiac workup is indicated  I do not believe this patient's complaints are from pulmonary embolism and I believe they would most likely be harmed through false positive test results and other complications of testing by further pursuing the diagnosis of pulmonary embolism  Supportive care, importance of follow-up and return precautions were discussed with the patient, who expressed understanding        Critical Care Time  Procedures

## 2022-08-10 ENCOUNTER — HOSPITAL ENCOUNTER (EMERGENCY)
Facility: HOSPITAL | Age: 37
Discharge: HOME | End: 2022-08-10
Attending: EMERGENCY MEDICINE | Admitting: EMERGENCY MEDICINE
Payer: COMMERCIAL

## 2022-08-10 VITALS
HEART RATE: 72 BPM | DIASTOLIC BLOOD PRESSURE: 79 MMHG | SYSTOLIC BLOOD PRESSURE: 123 MMHG | RESPIRATION RATE: 18 BRPM | OXYGEN SATURATION: 100 % | TEMPERATURE: 98.1 F

## 2022-08-10 DIAGNOSIS — K29.70 GASTRITIS: ICD-10-CM

## 2022-08-10 DIAGNOSIS — R11.2 NAUSEA AND VOMITING: Primary | ICD-10-CM

## 2022-08-10 LAB
BILIRUB UR QL STRIP: NEGATIVE
CLARITY UR: CLEAR
COLOR UR: YELLOW
EXT PREG TEST URINE: NEGATIVE
EXT. CONTROL ED NAV: NORMAL
GLUCOSE UR STRIP-MCNC: NEGATIVE MG/DL
HGB UR QL STRIP.AUTO: NEGATIVE
KETONES UR STRIP-MCNC: NEGATIVE MG/DL
LEUKOCYTE ESTERASE UR QL STRIP: NEGATIVE
NITRITE UR QL STRIP: NEGATIVE
PH UR STRIP.AUTO: 5.5 [PH] (ref 4.5–8)
PROT UR STRIP-MCNC: NEGATIVE MG/DL
SP GR UR STRIP.AUTO: >=1.03 (ref 1–1.03)
UROBILINOGEN UR QL STRIP.AUTO: 0.2 E.U./DL

## 2022-08-10 PROCEDURE — 81025 URINE PREGNANCY TEST: CPT | Performed by: EMERGENCY MEDICINE

## 2022-08-10 PROCEDURE — 99284 EMERGENCY DEPT VISIT MOD MDM: CPT | Performed by: EMERGENCY MEDICINE

## 2022-08-10 PROCEDURE — 81003 URINALYSIS AUTO W/O SCOPE: CPT

## 2022-08-10 PROCEDURE — 99283 EMERGENCY DEPT VISIT LOW MDM: CPT

## 2022-08-10 PROCEDURE — 36415 COLL VENOUS BLD VENIPUNCTURE: CPT | Performed by: EMERGENCY MEDICINE

## 2022-08-10 RX ORDER — ONDANSETRON 2 MG/ML
4 INJECTION INTRAMUSCULAR; INTRAVENOUS ONCE
Status: DISCONTINUED | OUTPATIENT
Start: 2022-08-10 | End: 2022-08-10

## 2022-08-10 RX ORDER — PANTOPRAZOLE SODIUM 40 MG/10ML
40 INJECTION, POWDER, LYOPHILIZED, FOR SOLUTION INTRAVENOUS ONCE
Status: DISCONTINUED | OUTPATIENT
Start: 2022-08-10 | End: 2022-08-10

## 2022-08-10 RX ORDER — PANTOPRAZOLE SODIUM 40 MG/1
40 TABLET, DELAYED RELEASE ORAL ONCE
Status: COMPLETED | OUTPATIENT
Start: 2022-08-10 | End: 2022-08-10

## 2022-08-10 RX ORDER — ONDANSETRON 4 MG/1
4 TABLET, FILM COATED ORAL EVERY 8 HOURS PRN
Qty: 12 TABLET | Refills: 0 | Status: SHIPPED | OUTPATIENT
Start: 2022-08-10

## 2022-08-10 RX ORDER — OMEPRAZOLE 20 MG/1
20 CAPSULE, DELAYED RELEASE ORAL DAILY
Qty: 20 CAPSULE | Refills: 0 | Status: SHIPPED | OUTPATIENT
Start: 2022-08-10

## 2022-08-10 RX ORDER — ONDANSETRON 4 MG/1
4 TABLET, ORALLY DISINTEGRATING ORAL ONCE
Status: COMPLETED | OUTPATIENT
Start: 2022-08-10 | End: 2022-08-10

## 2022-08-10 RX ADMIN — PANTOPRAZOLE SODIUM 40 MG: 40 TABLET, DELAYED RELEASE ORAL at 09:48

## 2022-08-10 RX ADMIN — ONDANSETRON 4 MG: 4 TABLET, ORALLY DISINTEGRATING ORAL at 09:48

## 2022-08-10 NOTE — ED PROVIDER NOTES
History  Chief Complaint   Patient presents with    Vomiting     Patient reports heartburn that is chronic for past year and intermittent  Heartburn started again yesterday and now have vomiting this morning with it  Epigastric pain/burning present     31 y/o female with heartburn/epigastric discomfort since about 10pm last night  She states that she couldn't sleep because of the pain  +n/v several times  No fevers, no urinary symptoms  No cp, no sob  Pt  Took tums and zantac without relief  Prior to Admission Medications   Prescriptions Last Dose Informant Patient Reported? Taking? albuterol (5 mg/mL) 0 5 % nebulizer solution Past Month at Unknown time  No Yes   Sig: Take 0 5 mL (2 5 mg total) by nebulization every 6 (six) hours as needed for wheezing   albuterol (PROVENTIL HFA,VENTOLIN HFA) 90 mcg/act inhaler Past Month at Unknown time  No Yes   Sig: Inhale 2 puffs every 4 (four) hours as needed for wheezing   loratadine (CLARITIN) 10 mg tablet Past Month at Unknown time  No Yes   Sig: Take 1 tablet (10 mg total) by mouth daily      Facility-Administered Medications: None       Past Medical History:   Diagnosis Date    Asthma     No known problems        History reviewed  No pertinent surgical history  History reviewed  No pertinent family history  I have reviewed and agree with the history as documented  E-Cigarette/Vaping    E-Cigarette Use Never User      E-Cigarette/Vaping Substances    Nicotine No     THC No     CBD No     Flavoring No     Other No     Unknown No      Social History     Tobacco Use    Smoking status: Never Smoker    Smokeless tobacco: Never Used   Vaping Use    Vaping Use: Never used   Substance Use Topics    Alcohol use: Never    Drug use: Never       Review of Systems   Constitutional: Negative for appetite change, fatigue and fever  HENT: Negative for rhinorrhea and sore throat  Eyes: Negative for pain     Respiratory: Negative for cough, shortness of breath and wheezing  Cardiovascular: Negative for chest pain and leg swelling  Gastrointestinal: Positive for abdominal pain, nausea and vomiting  Negative for diarrhea  Genitourinary: Negative for dysuria and flank pain  Musculoskeletal: Negative for back pain and neck pain  Skin: Negative for rash  Neurological: Negative for syncope and headaches  Psychiatric/Behavioral:        Mood normal       Physical Exam  Physical Exam  Vitals and nursing note reviewed  Constitutional:       Appearance: She is well-developed  HENT:      Head: Normocephalic and atraumatic  Right Ear: External ear normal       Left Ear: External ear normal    Eyes:      General: No scleral icterus  Extraocular Movements: Extraocular movements intact  Cardiovascular:      Rate and Rhythm: Normal rate and regular rhythm  Pulmonary:      Effort: Pulmonary effort is normal  No respiratory distress  Breath sounds: Normal breath sounds  Abdominal:      Palpations: Abdomen is soft  Comments: Epigastric abd  Tenderness, no r/g   Musculoskeletal:         General: No deformity or signs of injury  Normal range of motion  Cervical back: Normal range of motion and neck supple  Skin:     General: Skin is warm and dry  Coloration: Skin is not jaundiced or pale  Neurological:      General: No focal deficit present  Mental Status: She is alert and oriented to person, place, and time     Psychiatric:         Mood and Affect: Mood normal          Behavior: Behavior normal          Vital Signs  ED Triage Vitals [08/10/22 0803]   Temperature Pulse Respirations Blood Pressure SpO2   98 1 °F (36 7 °C) 72 18 123/79 100 %      Temp Source Heart Rate Source Patient Position - Orthostatic VS BP Location FiO2 (%)   Oral Monitor Sitting Right arm --      Pain Score       7           Vitals:    08/10/22 0803   BP: 123/79   Pulse: 72   Patient Position - Orthostatic VS: Sitting         Visual Acuity      ED Medications  Medications   pantoprazole (PROTONIX) EC tablet 40 mg (40 mg Oral Given 8/10/22 0948)   ondansetron (ZOFRAN-ODT) dispersible tablet 4 mg (4 mg Oral Given 8/10/22 0948)       Diagnostic Studies  Results Reviewed     Procedure Component Value Units Date/Time    POCT pregnancy, urine [[id number]]  (Normal) Resulted: 08/10/22 0907    Lab Status: Final result Updated: 08/10/22 0907     EXT PREG TEST UR (Ref: Negative) negative     Control valid    Urine Macroscopic, POC [[id number]] Collected: 08/10/22 0905    Lab Status: Final result Specimen: Urine Updated: 08/10/22 0907     Color, UA Yellow     Clarity, UA Clear     pH, UA 5 5     Leukocytes, UA Negative     Nitrite, UA Negative     Protein, UA Negative mg/dl      Glucose, UA Negative mg/dl      Ketones, UA Negative mg/dl      Urobilinogen, UA 0 2 E U /dl      Bilirubin, UA Negative     Occult Blood, UA Negative     Specific Gravity, UA >=1 030    Narrative:      CLINITEK RESULT                 No orders to display              Procedures  Procedures         ED Course                                             MDM  Number of Diagnoses or Management Options     Amount and/or Complexity of Data Reviewed  Clinical lab tests: ordered    Risk of Complications, Morbidity, and/or Mortality  Presenting problems: moderate  General comments: Pt 's bloodwork was hemolyzed, she doesn't want to be stuck anymore because she feels completely better after meds  She understands to return for any worsening of symptoms  Stable for outpt   Follow up        Disposition  Final diagnoses:   Nausea and vomiting   Gastritis     Time reflects when diagnosis was documented in both MDM as applicable and the Disposition within this note     Time User Action Codes Description Comment    8/10/2022 10:45 AM Mindi Cason Add [R11 2] Nausea and vomiting     8/10/2022 10:45 AM Jose Cason Add [K29 70] Gastritis       ED Disposition     ED Disposition   Discharge    Condition   Stable    Date/Time   Wed Aug 10, 2022 10:45 AM    Comment   Christian Ovalle discharge to home/self care  Follow-up Information     Follow up With Specialties Details Why Contact Info Additional [address] [facility]   [address]  [address] [facility]   [address]  [place] [address] [facility] [address]  [address] [facility], [address], [address] [place] [address], [place], [place], [id number] [phone]          Discharge Medication List as of 8/10/2022 10:46 AM      START taking these medications    Details   omeprazole (PriLOSEC) 20 mg delayed release capsule Take 1 capsule (20 mg total) by mouth daily, Starting Wed 8/10/2022, Normal      ondansetron (ZOFRAN) 4 mg tablet Take 1 tablet (4 mg total) by mouth every 8 (eight) hours as needed for nausea, Starting Wed 8/10/2022, Normal         CONTINUE these medications which have NOT CHANGED    Details   albuterol (5 mg/mL) 0 5 % nebulizer solution Take 0 5 mL (2 5 mg total) by nebulization every 6 (six) hours as needed for wheezing, Starting Tue 11/30/2021, Normal      albuterol (PROVENTIL HFA,VENTOLIN HFA) 90 mcg/act inhaler Inhale 2 puffs every 4 (four) hours as needed for wheezing, Starting Tue 11/30/2021, Normal      loratadine (CLARITIN) 10 mg tablet Take 1 tablet (10 mg total) by mouth daily, Starting Tue 10/26/2021, Normal             No discharge procedures on file      PDMP Review     None          ED Provider  Electronically Signed by           Carine Martinez MD  08/10/22 4800

## 2022-08-10 NOTE — ED NOTES
RN went to attempt IV with US and pt requesting to speak to provider in regards to the importance of her labs  Pt reports she just had blood work 4 days ago  Provider made aware        Lory Shelton RN  08/10/22 6305

## 2022-08-10 NOTE — ED NOTES
RN attempted for IV access with no success  Will consult with provider        Amy Pappas RN  08/10/22 6982

## 2022-08-10 NOTE — Clinical Note
Farooq Emilio was seen and treated in our emergency department on 8/10/2022  Diagnosis:     Dahlia    She may return on this date: 08/11/2022         If you have any questions or concerns, please don't hesitate to call        Amado Wright RN    ______________________________           _______________          _______________  Hospital Representative                              Date                                Time

## 2022-08-30 ENCOUNTER — HOSPITAL ENCOUNTER (EMERGENCY)
Facility: HOSPITAL | Age: 37
Discharge: HOME | End: 2022-08-30
Attending: EMERGENCY MEDICINE | Admitting: EMERGENCY MEDICINE
Payer: COMMERCIAL

## 2022-08-30 VITALS
RESPIRATION RATE: 18 BRPM | TEMPERATURE: 98.4 F | SYSTOLIC BLOOD PRESSURE: 95 MMHG | OXYGEN SATURATION: 100 % | BODY MASS INDEX: 26 KG/M2 | WEIGHT: 166.01 LBS | HEART RATE: 78 BPM | DIASTOLIC BLOOD PRESSURE: 70 MMHG
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
## 2022-08-30 DIAGNOSIS — Z23 NEED FOR RABIES VACCINATION: ICD-10-CM

## 2022-08-30 PROCEDURE — 90375 RABIES IG IM/SC: CPT | Performed by: PHYSICIAN ASSISTANT

## 2022-08-30 PROCEDURE — 90675 RABIES VACCINE IM: CPT | Performed by: PHYSICIAN ASSISTANT

## 2022-08-30 PROCEDURE — 99283 EMERGENCY DEPT VISIT LOW MDM: CPT

## 2022-08-30 PROCEDURE — 99282 EMERGENCY DEPT VISIT SF MDM: CPT | Performed by: PHYSICIAN ASSISTANT

## 2022-08-30 PROCEDURE — 90471 IMMUNIZATION ADMIN: CPT

## 2022-08-30 PROCEDURE — 96372 THER/PROPH/DIAG INJ SC/IM: CPT

## 2022-08-30 RX ORDER — BACITRACIN, NEOMYCIN, POLYMYXIN B 400; 3.5; 5 [USP'U]/G; MG/G; [USP'U]/G
1 OINTMENT TOPICAL ONCE
Status: COMPLETED | OUTPATIENT
Start: 2022-08-30 | End: 2022-08-30

## 2022-08-30 RX ADMIN — POLYMYXIN B SULFATE, BACITRACIN ZINC, NEOMYCIN SULFATE 1 LARGE APPLICATION: 5000; 3.5; 4 OINTMENT TOPICAL at 08:44

## 2022-08-30 RX ADMIN — Medication 1 ML: at 08:39

## 2022-08-30 RX ADMIN — RABIES IMMUNE GLOBULIN (HUMAN) 1500 UNITS: 300 INJECTION, SOLUTION INFILTRATION; INTRAMUSCULAR at 08:39

## 2022-08-30 NOTE — DISCHARGE INSTRUCTIONS
Please return on these dates for rabies vaccine series:   Day 0 - 8/30/22  Day 3 - 9/2/22  Day 7 - 9/6/22  Day 14 - 9/13/22

## 2022-08-30 NOTE — ED PROVIDER NOTES
History  Chief Complaint   Patient presents with    Animal Bite     Pt reports yesterday she was bite and scratched to bilateral lower legs by a stray cat  Pt was referred here by urgent care  Pt reports was here yesterday but left due to prolonged wait     Patient is a 31 y/o female hx of asthma, UTD on tetanus (8/29/22), presenting to the ED for evaluation of cat bite  Pt states yesterday she had a stray kitten attack her left leg and could not get the cat off, cat bit her left leg and multiple scratches to bilateral legs  Pt was able to get the cat off  Pt went to urgent care, had tetanus updated, was given rx for augmentin and told to go to the ED for rabies vaccine  Pt reports she did clean wounds and has been using bandage  Pt without fever  Prior to Admission Medications   Prescriptions Last Dose Informant Patient Reported? Taking? albuterol (5 mg/mL) 0 5 % nebulizer solution Past Week at Unknown time  No Yes   Sig: Take 0 5 mL (2 5 mg total) by nebulization every 6 (six) hours as needed for wheezing   albuterol (PROVENTIL HFA,VENTOLIN HFA) 90 mcg/act inhaler Past Week at Unknown time  No Yes   Sig: Inhale 2 puffs every 4 (four) hours as needed for wheezing   loratadine (CLARITIN) 10 mg tablet Not Taking at Unknown time  No No   Sig: Take 1 tablet (10 mg total) by mouth daily   Patient not taking: Reported on 8/30/2022   omeprazole (PriLOSEC) 20 mg delayed release capsule Not Taking at Unknown time  No No   Sig: Take 1 capsule (20 mg total) by mouth daily   Patient not taking: Reported on 8/30/2022   ondansetron (ZOFRAN) 4 mg tablet Not Taking at Unknown time  No No   Sig: Take 1 tablet (4 mg total) by mouth every 8 (eight) hours as needed for nausea   Patient not taking: Reported on 8/30/2022      Facility-Administered Medications: None       Past Medical History:   Diagnosis Date    Asthma     No known problems        History reviewed  No pertinent surgical history  History reviewed   No pertinent family history  I have reviewed and agree with the history as documented  E-Cigarette/Vaping    E-Cigarette Use Never User      E-Cigarette/Vaping Substances    Nicotine No     THC No     CBD No     Flavoring No     Other No     Unknown No      Social History     Tobacco Use    Smoking status: Never Smoker    Smokeless tobacco: Never Used   Vaping Use    Vaping Use: Never used   Substance Use Topics    Alcohol use: Never    Drug use: Never       Review of Systems   Skin: Positive for wound  All other systems reviewed and are negative  Physical Exam  Physical Exam  Constitutional:       Appearance: Normal appearance  HENT:      Head: Normocephalic and atraumatic  Right Ear: External ear normal       Left Ear: External ear normal       Nose: Nose normal       Mouth/Throat:      Lips: Pink  Mouth: Mucous membranes are moist    Eyes:      Extraocular Movements: Extraocular movements intact  Conjunctiva/sclera: Conjunctivae normal    Pulmonary:      Effort: No tachypnea or respiratory distress  Musculoskeletal:      Cervical back: Normal range of motion and neck supple  Skin:     General: Skin is warm  Capillary Refill: Capillary refill takes less than 2 seconds  Comments: Images below of cat bites/scratches    Neurological:      Mental Status: She is alert and oriented to person, place, and time  GCS: GCS eye subscore is 4  GCS verbal subscore is 5  GCS motor subscore is 6     Psychiatric:         Mood and Affect: Mood and affect normal          Speech: Speech normal                      Vital Signs  ED Triage Vitals   Temperature Pulse Respirations Blood Pressure SpO2   08/30/22 0640 08/30/22 0640 08/30/22 0640 08/30/22 0640 08/30/22 0640   98 4 °F (36 9 °C) 82 20 118/79 97 %      Temp Source Heart Rate Source Patient Position - Orthostatic VS BP Location FiO2 (%)   08/30/22 0640 08/30/22 0640 08/30/22 0841 08/30/22 0640 --   Oral Monitor Sitting Right arm       Pain Score       08/30/22 0640       3           Vitals:    08/30/22 0640 08/30/22 0841   BP: 118/79 95/70   Pulse: 82 78   Patient Position - Orthostatic VS:  Sitting         Visual Acuity      ED Medications  Medications   rabies vaccine, human diploid (IMOVAX RABIES) IM injection 1 mL (1 mL Intramuscular Given 8/30/22 0839)   rabies immune globulin, human (HyperRAB) injection 1,500 Units (1,500 Units Infiltration Given by Other 8/30/22 0839)   neomycin-bacitracin-polymyxin b (NEOSPORIN) ointment 1 large application (1 large application Topical Given by Other 8/30/22 0844)       Diagnostic Studies  Results Reviewed     None                 No orders to display              Procedures  Procedures         ED Course                               SBIRT 20yo+    Flowsheet Row Most Recent Value   SBIRT (25 yo +)    In order to provide better care to our patients, we are screening all of our patients for alcohol and drug use  Would it be okay to ask you these screening questions? No Filed at: 08/30/2022 0803                    MDM  Number of Diagnoses or Management Options  Cat bite, initial encounter  Need for rabies vaccination  Diagnosis management comments: Patient is a 31 y/o female hx of asthma, UTD on tetanus (8/29/22), presenting to the ED for evaluation of cat bite  Images above of cat bite/scratches  Discussed risks v benefits of rabies vaccine series with patient - wishes to proceed - will give rabies immunoglobulin today (infiltrated into bites) and first dose of rabies vaccine   Wounds cleaned and dressed  Patient on abx from urgent care and tetanus up to date  Patient to return on day 3, day 7 and day 14 for repeat vaccine   S/s of infection discussed with patient and reasons to return to the ED    Patient verbalizes understanding and agrees with plan  The management plan was discussed in detail with the patient at bedside and all questions were answered   Prior to discharge, I provided both verbal and written instructions  I discussed with the patient the signs and symptoms for which to return to the emergency department  All questions were answered and patient was comfortable with the plan of care and discharged to home  The patient agrees to return to the Emergency Department for concerns and/or progression of illness  Disposition  Final diagnoses:   Cat bite, initial encounter   Need for rabies vaccination     Time reflects when diagnosis was documented in both MDM as applicable and the Disposition within this note     Time User Action Codes Description Comment    8/30/2022  8:51 AM Earlean Cotton Add [W55 01XA] Cat bite, initial encounter     8/30/2022  8:51 AM Earlean Cotton Add [Z23] Need for rabies vaccination       ED Disposition     ED Disposition   Discharge    Condition   Stable    Date/Time   Tue Aug 30, 2022  8:51 AM    Comment   Julio Cesar Patel discharge to home/self care  Follow-up Information     Follow up With Specialties Details Why Contact Info Additional [address] [facility] Emergency Medicine Go to  for rabies vaccine [place] [phone]  [address] [facility] Emergency Department, [address] [place] [address] [place]  , [place], [place], [address]          Patient's Medications   Discharge Prescriptions    No medications on file       No discharge procedures on file      PDMP Review     None          ED Provider  Electronically Signed by           Caitlyn Gates PA-C  08/30/22 9631

## 2022-08-30 NOTE — Clinical Note
Alla Domínguezch was seen and treated in our emergency department on 8/30/2022  Diagnosis:     Kaveh Chaudhary  may return to work on return date  She may return on this date: 08/31/2022         If you have any questions or concerns, please don't hesitate to call        Jaret Polo PA-C    ______________________________           _______________          _______________  Hospital Representative                              Date                                Time

## 2022-11-09 ENCOUNTER — HOSPITAL ENCOUNTER (EMERGENCY)
Facility: HOSPITAL | Age: 37
Discharge: HOME | End: 2022-11-09
Attending: EMERGENCY MEDICINE

## 2022-11-09 VITALS
OXYGEN SATURATION: 100 % | SYSTOLIC BLOOD PRESSURE: 119 MMHG | RESPIRATION RATE: 18 BRPM | BODY MASS INDEX: 26.83 KG/M2 | TEMPERATURE: 98.4 F | DIASTOLIC BLOOD PRESSURE: 69 MMHG | HEART RATE: 65 BPM | WEIGHT: 171.3 LBS

## 2022-11-09 DIAGNOSIS — T78.40XA ALLERGIC REACTION, INITIAL ENCOUNTER: Primary | ICD-10-CM

## 2022-11-09 RX ORDER — PREDNISONE 20 MG/1
60 TABLET ORAL DAILY
Qty: 12 TABLET | Refills: 0 | Status: SHIPPED | OUTPATIENT
Start: 2022-11-10 | End: 2022-11-14

## 2022-11-09 RX ORDER — FAMOTIDINE 20 MG/1
20 TABLET, FILM COATED ORAL ONCE
Status: COMPLETED | OUTPATIENT
Start: 2022-11-09 | End: 2022-11-09

## 2022-11-09 RX ORDER — PREDNISONE 20 MG/1
60 TABLET ORAL ONCE
Status: COMPLETED | OUTPATIENT
Start: 2022-11-09 | End: 2022-11-09

## 2022-11-09 RX ORDER — EPINEPHRINE 0.3 MG/.3ML
0.3 INJECTION SUBCUTANEOUS ONCE
Qty: 0.6 ML | Refills: 0 | Status: SHIPPED | OUTPATIENT
Start: 2022-11-09 | End: 2022-11-09

## 2022-11-09 RX ADMIN — PREDNISONE 60 MG: 20 TABLET ORAL at 14:31

## 2022-11-09 RX ADMIN — FAMOTIDINE 20 MG: 20 TABLET ORAL at 14:31

## 2022-11-09 NOTE — ED PROVIDER NOTES
History  Chief Complaint   Patient presents with   • Allergic Reaction     Pt reports she ate shellfish prior to arrival at 11;30   Pt reports being allergic and needing an epi pen  previously for shellfish  Pt reports she took 2 benadryl prior to arrivals and the hives have gone down  Pt reports tongue itching      Patient is a 29-year-old female  She has a known shellfish allergy  She accidentally ate shrimp case it he is around 11 30  She developed some hives to her chest and some tingling to her tongue  She took some Benadryl  Since then does symptoms have resolved  She is pretty much asymptomatic at present  She is not having any trouble breathing  No facial swelling  She does have an EpiPen at home, but it is   Prior to Admission Medications   Prescriptions Last Dose Informant Patient Reported? Taking? albuterol (5 mg/mL) 0 5 % nebulizer solution   No No   Sig: Take 0 5 mL (2 5 mg total) by nebulization every 6 (six) hours as needed for wheezing   albuterol (PROVENTIL HFA,VENTOLIN HFA) 90 mcg/act inhaler   No No   Sig: Inhale 2 puffs every 4 (four) hours as needed for wheezing   loratadine (CLARITIN) 10 mg tablet Not Taking at Unknown time  No No   Sig: Take 1 tablet (10 mg total) by mouth daily   Patient not taking: No sig reported   omeprazole (PriLOSEC) 20 mg delayed release capsule Not Taking at Unknown time  No No   Sig: Take 1 capsule (20 mg total) by mouth daily   Patient not taking: No sig reported   ondansetron (ZOFRAN) 4 mg tablet Not Taking at Unknown time  No No   Sig: Take 1 tablet (4 mg total) by mouth every 8 (eight) hours as needed for nausea   Patient not taking: No sig reported      Facility-Administered Medications: None       Past Medical History:   Diagnosis Date   • Asthma    • No known problems        History reviewed  No pertinent surgical history  History reviewed  No pertinent family history    I have reviewed and agree with the history as documented  E-Cigarette/Vaping   • E-Cigarette Use Never User      E-Cigarette/Vaping Substances   • Nicotine No    • THC No    • CBD No    • Flavoring No    • Other No    • Unknown No      Social History     Tobacco Use   • Smoking status: Never Smoker   • Smokeless tobacco: Never Used   Vaping Use   • Vaping Use: Never used   Substance Use Topics   • Alcohol use: Never   • Drug use: Never       Review of Systems   Constitutional: Negative for chills and fever  HENT: Negative for rhinorrhea and sore throat  Eyes: Negative for pain, redness and visual disturbance  Respiratory: Negative for cough and shortness of breath  Cardiovascular: Negative for chest pain and leg swelling  Gastrointestinal: Negative for abdominal pain, diarrhea and vomiting  Endocrine: Negative for polydipsia and polyuria  Genitourinary: Negative for dysuria, frequency, hematuria, vaginal bleeding and vaginal discharge  Musculoskeletal: Negative for back pain and neck pain  Skin: Positive for rash  Negative for wound  Allergic/Immunologic: Positive for food allergies  Negative for immunocompromised state  Neurological: Negative for weakness, numbness and headaches  Hematological: Does not bruise/bleed easily  Psychiatric/Behavioral: Negative for hallucinations and suicidal ideas  All other systems reviewed and are negative  Physical Exam  Physical Exam  Vitals reviewed  Constitutional:       General: She is not in acute distress  HENT:      Head: Normocephalic and atraumatic  Nose: Nose normal       Mouth/Throat:      Mouth: Mucous membranes are moist       Pharynx: Oropharynx is clear  No oropharyngeal exudate or posterior oropharyngeal erythema  Eyes:      General:         Right eye: No discharge  Left eye: No discharge  Conjunctiva/sclera: Conjunctivae normal    Cardiovascular:      Rate and Rhythm: Normal rate and regular rhythm  Pulses: Normal pulses        Heart sounds: Normal heart sounds  No murmur heard  No friction rub  No gallop  Pulmonary:      Effort: Pulmonary effort is normal  No respiratory distress  Breath sounds: Normal breath sounds  No stridor  No wheezing, rhonchi or rales  Abdominal:      General: Bowel sounds are normal  There is no distension  Palpations: Abdomen is soft  Tenderness: There is no abdominal tenderness  There is no right CVA tenderness, left CVA tenderness, guarding or rebound  Musculoskeletal:         General: No swelling, tenderness, deformity or signs of injury  Normal range of motion  Cervical back: Normal range of motion and neck supple  No rigidity  Right lower leg: No edema  Left lower leg: No edema  Comments: No calf tenderness or unilateral leg swelling  Skin:     General: Skin is warm and dry  Capillary Refill: Capillary refill takes less than 2 seconds  Coloration: Skin is not jaundiced  Findings: No rash  Neurological:      General: No focal deficit present  Mental Status: She is alert and oriented to person, place, and time  Sensory: No sensory deficit  Motor: Motor function is intact     Psychiatric:         Mood and Affect: Mood normal          Behavior: Behavior normal          Vital Signs  ED Triage Vitals [11/09/22 1343]   Temperature Pulse Respirations Blood Pressure SpO2   98 4 °F (36 9 °C) 65 18 119/69 100 %      Temp src Heart Rate Source Patient Position - Orthostatic VS BP Location FiO2 (%)   -- -- -- -- --      Pain Score       --           Vitals:    11/09/22 1343   BP: 119/69   Pulse: 65         Visual Acuity      ED Medications  Medications   predniSONE tablet 60 mg (60 mg Oral Given 11/9/22 1431)   famotidine (PEPCID) tablet 20 mg (20 mg Oral Given 11/9/22 1431)       Diagnostic Studies  Results Reviewed     None                 No orders to display              Procedures  Procedures         ED Course MDM  Number of Diagnoses or Management Options  Diagnosis management comments: No urticaria present  No angioedema or anaphylaxis  Appropriate for discharge and outpatient management  Will add steroids and Pepcid  Will refill EpiPen      Disposition  Final diagnoses: Allergic reaction, initial encounter     Time reflects when diagnosis was documented in both MDM as applicable and the Disposition within this note     Time User Action Codes Description Comment    11/9/2022  2:25 PM Rosemary Barbosa Add [T78 40XA] Allergic reaction, initial encounter       ED Disposition     ED Disposition   Discharge    Condition   Stable    Date/Time   Wed Nov 9, 2022  2:25 PM    Comment   Raven Howe discharge to home/self care                 Follow-up Information     Follow up With Specialties Details Why [address] [facility] In 2 days As needed [address]  [phone]            Discharge Medication List as of 11/9/2022  2:28 PM      START taking these medications    Details   EPINEPHrine (EPIPEN) 0 3 mg/0 3 mL SOAJ Inject 0 3 mL (0 3 mg total) into a muscle once for 1 dose, Starting Wed 11/9/2022, Normal      predniSONE 20 mg tablet Take 3 tablets (60 mg total) by mouth daily for 4 days Do not start before November 10, 2022 , Starting Thu 11/10/2022, Until Mon 11/14/2022, Normal         CONTINUE these medications which have NOT CHANGED    Details   albuterol (5 mg/mL) 0 5 % nebulizer solution Take 0 5 mL (2 5 mg total) by nebulization every 6 (six) hours as needed for wheezing, Starting Tue 11/30/2021, Normal      albuterol (PROVENTIL HFA,VENTOLIN HFA) 90 mcg/act inhaler Inhale 2 puffs every 4 (four) hours as needed for wheezing, Starting Tue 11/30/2021, Normal      loratadine (CLARITIN) 10 mg tablet Take 1 tablet (10 mg total) by mouth daily, Starting Tue 10/26/2021, Normal      omeprazole (PriLOSEC) 20 mg delayed release capsule Take 1 capsule (20 mg total) by mouth daily, Starting Wed 8/10/2022, Normal      ondansetron (ZOFRAN) 4 mg tablet Take 1 tablet (4 mg total) by mouth every 8 (eight) hours as needed for nausea, Starting Wed 8/10/2022, Normal             No discharge procedures on file      PDMP Review     None          ED Provider  Electronically Signed by           Corina Lundy MD  11/09/22 9089

## 2022-11-09 NOTE — DISCHARGE INSTRUCTIONS
Over-the-counter Benadryl 25 mg by mouth every 4 hours as needed for rash and itching  May take 50 mg before bed    Over-the-counter Pepcid 20 mg by mouth 2 times a day as needed for rash and itching  EpiPen as needed for trouble breathing or facial swelling  Prednisone as directed

## 2023-02-14 ENCOUNTER — HOSPITAL ENCOUNTER (EMERGENCY)
Facility: HOSPITAL | Age: 38
Discharge: HOME | End: 2023-02-14
Attending: EMERGENCY MEDICINE

## 2023-02-14 ENCOUNTER — APPOINTMENT (EMERGENCY)
Dept: RADIOLOGY | Facility: HOSPITAL | Age: 38
End: 2023-02-14

## 2023-02-14 VITALS
RESPIRATION RATE: 16 BRPM | SYSTOLIC BLOOD PRESSURE: 123 MMHG | OXYGEN SATURATION: 98 % | HEART RATE: 84 BPM | TEMPERATURE: 98.5 F | DIASTOLIC BLOOD PRESSURE: 85 MMHG

## 2023-02-14 DIAGNOSIS — M79.601 RIGHT ARM PAIN: Primary | ICD-10-CM

## 2023-02-14 RX ORDER — NAPROXEN 500 MG/1
500 TABLET ORAL 2 TIMES DAILY WITH MEALS
Qty: 30 TABLET | Refills: 0 | Status: SHIPPED | OUTPATIENT
Start: 2023-02-14

## 2023-02-14 NOTE — DISCHARGE INSTRUCTIONS
DISCHARGE INSTRUCTIONS:    FOLLOW UP WITH YOUR PRIMARY CARE PROVIDER OR THE [address] [place] [address]  MAKE AN APPOINTMENT TO BE SEEN  TAKE MEDICATION AS PRESCRIBED FOR PAIN  IF RASH, SHORTNESS OF BREATH OR TROUBLE SWALLOWING, STOP TAKING THE MEDICATION AND BE SEEN  REST  FOLLOW UP WITH ORTHOPEDICS AS NEEDED  IF SYMPTOMS WORSEN OR NEW SYMPTOMS ARISE, RETURN TO THE ER TO BE SEEN

## 2023-02-14 NOTE — ED PROVIDER NOTES
History  Chief Complaint   Patient presents with   • Arm Pain     R arm and shoulder pain x 2 days  Pt unsure of injury      38y  o female with PMH of asthma presents to the ER right humerus pain for 3 days  Patient states prior to pain beginning, she was carrying groceries but cannot remember injuring her arm  She began with sharp pain on on the anterior side of her arm first and now she feels like a bruised pain to the posterior part of her arm  She reports cracking of her mid humerus and pulling at her shoulder  Pain comes and goes  She is right hand dominant  She denies fever, chills, URI symptoms, chest pain, dyspnea, N/V/D, abdominal pain, weakness or paresthesias  History provided by:  Patient   used: No        Prior to Admission Medications   Prescriptions Last Dose Informant Patient Reported? Taking? EPINEPHrine (EPIPEN) 0 3 mg/0 3 mL SOAJ   No No   Sig: Inject 0 3 mL (0 3 mg total) into a muscle once for 1 dose   albuterol (5 mg/mL) 0 5 % nebulizer solution   No No   Sig: Take 0 5 mL (2 5 mg total) by nebulization every 6 (six) hours as needed for wheezing   albuterol (PROVENTIL HFA,VENTOLIN HFA) 90 mcg/act inhaler   No No   Sig: Inhale 2 puffs every 4 (four) hours as needed for wheezing   loratadine (CLARITIN) 10 mg tablet   No No   Sig: Take 1 tablet (10 mg total) by mouth daily   Patient not taking: No sig reported   omeprazole (PriLOSEC) 20 mg delayed release capsule   No No   Sig: Take 1 capsule (20 mg total) by mouth daily   Patient not taking: No sig reported   ondansetron (ZOFRAN) 4 mg tablet   No No   Sig: Take 1 tablet (4 mg total) by mouth every 8 (eight) hours as needed for nausea   Patient not taking: No sig reported      Facility-Administered Medications: None       Past Medical History:   Diagnosis Date   • Asthma    • No known problems        History reviewed  No pertinent surgical history  History reviewed  No pertinent family history    I have reviewed and agree with the history as documented  E-Cigarette/Vaping   • E-Cigarette Use Never User      E-Cigarette/Vaping Substances   • Nicotine No    • THC No    • CBD No    • Flavoring No    • Other No    • Unknown No      Social History     Tobacco Use   • Smoking status: Never   • Smokeless tobacco: Never   Vaping Use   • Vaping Use: Never used   Substance Use Topics   • Alcohol use: Never   • Drug use: Never       Review of Systems   Constitutional: Negative for activity change, appetite change, chills and fever  HENT: Negative for congestion, drooling, ear discharge, ear pain, facial swelling, rhinorrhea and sore throat  Eyes: Negative for redness  Respiratory: Negative for cough and shortness of breath  Cardiovascular: Negative for chest pain  Gastrointestinal: Negative for abdominal pain, diarrhea, nausea and vomiting  Musculoskeletal: Negative for neck stiffness  Skin: Negative for rash  Allergic/Immunologic: Positive for food allergies  Neurological: Negative for weakness and numbness  Physical Exam  Physical Exam  Vitals and nursing note reviewed  Constitutional:       General: She is not in acute distress  Appearance: She is not toxic-appearing  HENT:      Head: Normocephalic and atraumatic  Eyes:      Conjunctiva/sclera: Conjunctivae normal    Neck:      Trachea: No tracheal deviation  Cardiovascular:      Rate and Rhythm: Normal rate and regular rhythm  Heart sounds: Normal heart sounds, S1 normal and S2 normal  No murmur heard  No friction rub  No gallop  Pulmonary:      Effort: Pulmonary effort is normal  No respiratory distress  Breath sounds: Normal breath sounds  No decreased breath sounds, wheezing, rhonchi or rales  Chest:      Chest wall: No tenderness  Abdominal:      General: There is no distension  Musculoskeletal:      Right shoulder: Normal       Right upper arm: Tenderness present  No swelling, edema, deformity or lacerations  Right elbow: Normal         Arms:       Cervical back: Normal range of motion and neck supple  Skin:     General: Skin is warm and dry  Findings: No rash  Neurological:      Mental Status: She is alert  GCS: GCS eye subscore is 4  GCS verbal subscore is 5  GCS motor subscore is 6  Psychiatric:         Mood and Affect: Mood normal          Vital Signs  ED Triage Vitals [02/14/23 0758]   Temperature Pulse Respirations Blood Pressure SpO2   98 5 °F (36 9 °C) 84 16 123/85 98 %      Temp Source Heart Rate Source Patient Position - Orthostatic VS BP Location FiO2 (%)   Oral Monitor Sitting Right arm --      Pain Score       --           Vitals:    02/14/23 0758   BP: 123/85   Pulse: 84   Patient Position - Orthostatic VS: Sitting         Visual Acuity      ED Medications  Medications - No data to display    Diagnostic Studies  Results Reviewed     None                 XR humerus RIGHT   ED Interpretation by Dalia Briscoe PA-C (02/14 2917)   No acute findings seen by me at this time  Procedures  Procedures         ED Course                                             Medical Decision Making  12D  o female presents to the ER for right humerus pain for 3 days  Vitals are stable  Patient in no acute distress  On exam, no skin changes seen  No signs of infection or injury  No erythema, swelling ,ecchymosis or deformity  Mild tenderness to palpation of the humerus  No tenderness of the shoulder or elbow  Normal ROM of shoulder and elbow  Neurovascularly intact  Will check imaging  9004 - Informed patient I did not see any acute abnormalities on xray at this time and if the radiologist saw anything concerning when reading the xray, we would call to inform them  Patient agreeable  Will give patient anti-inflammatories and orthopedics to follow up with if symptoms do not improve   Patient reporting what sounds like a pinched nerve at times but then also reporting what sounds like muscular pain      The management plan was discussed in detail with the patient at bedside and all questions were answered  Prior to discharge, we provided both verbal and written instructions  We discussed with the patient the signs and symptoms for which to return to the emergency department  All questions were answered and patient was comfortable with the plan of care and discharged to home  Instructed the patient to follow up with the primary care provider and/or specialist provided and their written instructions  The patient verbalized understanding of our discussion and plan of care, and agrees to return to the Emergency Department for concerns and progression of illness  At discharge, I instructed the patient to:  -follow up with pcp  -take Naproxen as prescribed  -rest   -follow up with orthopedics if needed  -return to the ER if symptoms worsened or new symptoms arose  Patient agreed to this plan and was stable at time of discharge  Right arm pain: acute illness or injury  Amount and/or Complexity of Data Reviewed  Independent Historian:      Details: Patient is historian  Radiology: ordered and independent interpretation performed  Risk  Prescription drug management  Disposition  Final diagnoses:   Right arm pain     Time reflects when diagnosis was documented in both MDM as applicable and the Disposition within this note     Time User Action Codes Description Comment    2/14/2023  9:20 AM Maude GAMEZ Add [F24 587] Right arm pain       ED Disposition     ED Disposition   Discharge    Condition   Stable    Date/Time   Tue Feb 14, 2023  9:20 AM    Comment   Murali Lose discharge to home/self care                 Follow-up Information     Follow up With Specialties Details Why Contact Info Additional [address] [facility] Schedule an appointment as soon as possible for a visit  As needed [place] [address] [address]  [phone]       [facility] Orthopedic Surgery Schedule an appointment as soon as possible for a visit   [address] [facility] [address] [facility] [address]  [address] [place] [address], [address] [place] [address], [address], [address] [phone]          Patient's Medications   Discharge Prescriptions    NAPROXEN (NAPROSYN) 500 MG TABLET    Take 1 tablet (500 mg total) by mouth 2 (two) times a day with meals       Start Date: 2/14/2023 End Date: --       Order Dose: 500 mg       Quantity: 30 tablet    Refills: 0       No discharge procedures on file      PDMP Review     None          ED Provider  Electronically Signed by           Dalia Briscoe PA-C  02/14/23 6878

## 2023-06-12 ENCOUNTER — APPOINTMENT (EMERGENCY)
Dept: ULTRASOUND IMAGING | Facility: HOSPITAL | Age: 38
End: 2023-06-12
Payer: COMMERCIAL

## 2023-06-12 ENCOUNTER — HOSPITAL ENCOUNTER (EMERGENCY)
Facility: HOSPITAL | Age: 38
Discharge: HOME | End: 2023-06-12
Attending: EMERGENCY MEDICINE
Payer: COMMERCIAL

## 2023-06-12 VITALS
HEART RATE: 77 BPM | OXYGEN SATURATION: 99 % | SYSTOLIC BLOOD PRESSURE: 112 MMHG | WEIGHT: 175 LBS | HEIGHT: 67 IN | RESPIRATION RATE: 19 BRPM | BODY MASS INDEX: 27.47 KG/M2 | DIASTOLIC BLOOD PRESSURE: 70 MMHG | TEMPERATURE: 98.1 F

## 2023-06-12 DIAGNOSIS — N93.9 VAGINAL BLEEDING: Primary | ICD-10-CM

## 2023-06-12 LAB
ABO GROUP BLD: NORMAL
ALBUMIN SERPL BCP-MCNC: 4.3 G/DL (ref 3.5–5)
ALP SERPL-CCNC: 64 U/L (ref 34–104)
ALT SERPL W P-5'-P-CCNC: 13 U/L (ref 7–52)
ANION GAP SERPL CALCULATED.3IONS-SCNC: 5 MMOL/L (ref 4–13)
APTT PPP: 30 SECONDS (ref 23–37)
AST SERPL W P-5'-P-CCNC: 20 U/L (ref 13–39)
BACTERIA UR QL AUTO: ABNORMAL /HPF
BASOPHILS # BLD AUTO: 0.02 THOUSANDS/ÂΜL (ref 0–0.1)
BASOPHILS NFR BLD AUTO: 1 % (ref 0–1)
BILIRUB SERPL-MCNC: 0.68 MG/DL (ref 0.2–1)
BILIRUB UR QL STRIP: NEGATIVE
BLD GP AB SCN SERPL QL: NEGATIVE
BUN SERPL-MCNC: 15 MG/DL (ref 5–25)
CALCIUM SERPL-MCNC: 8.9 MG/DL (ref 8.4–10.2)
CHLORIDE SERPL-SCNC: 107 MMOL/L (ref 96–108)
CLARITY UR: CLEAR
CO2 SERPL-SCNC: 25 MMOL/L (ref 21–32)
COLOR UR: ABNORMAL
CREAT SERPL-MCNC: 0.66 MG/DL (ref 0.6–1.3)
EOSINOPHIL # BLD AUTO: 0.1 THOUSAND/ÂΜL (ref 0–0.61)
EOSINOPHIL NFR BLD AUTO: 2 % (ref 0–6)
ERYTHROCYTE [DISTWIDTH] IN BLOOD BY AUTOMATED COUNT: 13.1 % (ref 11.6–15.1)
EXT PREGNANCY TEST URINE: NEGATIVE
EXT. CONTROL: NORMAL
GFR SERPL CREATININE-BSD FRML MDRD: 114 ML/MIN/1.73SQ M
GLUCOSE SERPL-MCNC: 81 MG/DL (ref 65–140)
GLUCOSE UR STRIP-MCNC: NEGATIVE MG/DL
HCT VFR BLD AUTO: 42.6 % (ref 34.8–46.1)
HGB BLD-MCNC: 13.7 G/DL (ref 11.5–15.4)
HGB UR QL STRIP.AUTO: ABNORMAL
IMM GRANULOCYTES # BLD AUTO: 0.01 THOUSAND/UL (ref 0–0.2)
IMM GRANULOCYTES NFR BLD AUTO: 0 % (ref 0–2)
INR PPP: 0.94 (ref 0.84–1.19)
KETONES UR STRIP-MCNC: NEGATIVE MG/DL
LEUKOCYTE ESTERASE UR QL STRIP: NEGATIVE
LYMPHOCYTES # BLD AUTO: 1.2 THOUSANDS/ÂΜL (ref 0.6–4.47)
LYMPHOCYTES NFR BLD AUTO: 29 % (ref 14–44)
MCH RBC QN AUTO: 30.2 PG (ref 26.8–34.3)
MCHC RBC AUTO-ENTMCNC: 32.2 G/DL (ref 31.4–37.4)
MCV RBC AUTO: 94 FL (ref 82–98)
MONOCYTES # BLD AUTO: 0.29 THOUSAND/ÂΜL (ref 0.17–1.22)
MONOCYTES NFR BLD AUTO: 7 % (ref 4–12)
MUCOUS THREADS UR QL AUTO: ABNORMAL
NEUTROPHILS # BLD AUTO: 2.49 THOUSANDS/ÂΜL (ref 1.85–7.62)
NEUTS SEG NFR BLD AUTO: 61 % (ref 43–75)
NITRITE UR QL STRIP: NEGATIVE
NON-SQ EPI CELLS URNS QL MICRO: ABNORMAL /HPF
NRBC BLD AUTO-RTO: 0 /100 WBCS
PH UR STRIP.AUTO: 5.5 [PH]
PLATELET # BLD AUTO: 299 THOUSANDS/UL (ref 149–390)
PMV BLD AUTO: 11 FL (ref 8.9–12.7)
POTASSIUM SERPL-SCNC: 3.8 MMOL/L (ref 3.5–5.3)
PROT SERPL-MCNC: 7.5 G/DL (ref 6.4–8.4)
PROT UR STRIP-MCNC: ABNORMAL MG/DL
PROTHROMBIN TIME: 12.6 SECONDS (ref 11.6–14.5)
RBC # BLD AUTO: 4.53 MILLION/UL (ref 3.81–5.12)
RBC #/AREA URNS AUTO: ABNORMAL /HPF
RH BLD: POSITIVE
SODIUM SERPL-SCNC: 137 MMOL/L (ref 135–147)
SP GR UR STRIP.AUTO: 1.03 (ref 1–1.03)
SPECIMEN EXPIRATION DATE: NORMAL
UROBILINOGEN UR STRIP-ACNC: <2 MG/DL
WBC # BLD AUTO: 4.11 THOUSAND/UL (ref 4.31–10.16)
WBC #/AREA URNS AUTO: ABNORMAL /HPF

## 2023-06-12 PROCEDURE — 86850 RBC ANTIBODY SCREEN: CPT

## 2023-06-12 PROCEDURE — 96361 HYDRATE IV INFUSION ADD-ON: CPT

## 2023-06-12 PROCEDURE — 86900 BLOOD TYPING SEROLOGIC ABO: CPT

## 2023-06-12 PROCEDURE — 85025 COMPLETE CBC W/AUTO DIFF WBC: CPT

## 2023-06-12 PROCEDURE — 36415 COLL VENOUS BLD VENIPUNCTURE: CPT

## 2023-06-12 PROCEDURE — 81001 URINALYSIS AUTO W/SCOPE: CPT

## 2023-06-12 PROCEDURE — 76830 TRANSVAGINAL US NON-OB: CPT

## 2023-06-12 PROCEDURE — 81025 URINE PREGNANCY TEST: CPT

## 2023-06-12 PROCEDURE — 96360 HYDRATION IV INFUSION INIT: CPT

## 2023-06-12 PROCEDURE — 86901 BLOOD TYPING SEROLOGIC RH(D): CPT

## 2023-06-12 PROCEDURE — 99284 EMERGENCY DEPT VISIT MOD MDM: CPT

## 2023-06-12 PROCEDURE — 76856 US EXAM PELVIC COMPLETE: CPT

## 2023-06-12 PROCEDURE — 85610 PROTHROMBIN TIME: CPT

## 2023-06-12 PROCEDURE — 85730 THROMBOPLASTIN TIME PARTIAL: CPT

## 2023-06-12 PROCEDURE — 80053 COMPREHEN METABOLIC PANEL: CPT

## 2023-06-12 RX ADMIN — SODIUM CHLORIDE 1000 ML: 0.9 INJECTION, SOLUTION INTRAVENOUS at 11:15

## 2023-06-12 NOTE — DISCHARGE INSTRUCTIONS
Your workup today was reassuring  Please follow up with gynecologist  Return to ED if you develop worsening lightheadedness, abdominal pain, or you pass out

## 2023-06-12 NOTE — Clinical Note
[place] Vero was seen and treated in our emergency department on 6/12/2023  Diagnosis:     Brandon Falcon  may return to work on return date  She may return on this date: 06/13/2023         If you have any questions or concerns, please don't hesitate to call        Susi Aragon PA-C    ______________________________           _______________          _______________  Hospital Representative                              Date                                Time

## 2023-06-12 NOTE — ED PROVIDER NOTES
History  Chief Complaint   Patient presents with   • Vaginal Bleeding     Pt reports passing large clots, since yesterday, changing pad hourly since  Reports fatigue with activity  Episodes of feeling cold & hot, +dizziness     Patient is a 42-year-old female history of asthma presenting for evaluation of vaginal bleeding for 2 days  States yesterday she started to have hot flashes and fatigue before starting menstrual period  State her vaginal bleeding is much heavier than her usual menstrual periods and she is passing large clots  Reports having to change pad about once an hour  She has no associated abdominal pain or cramping, dysuria, diarrhea, constipation, nausea, vomiting  States she started to feel lightheaded when standing up so decided to come to the ED  No chest pain, palpitations, shortness of breath  No recent fevers or URI symptoms  She is currently sexually active but denies any abnormal vaginal discharge or concern for STIs  No history of gynecologic issues  Prior to Admission Medications   Prescriptions Last Dose Informant Patient Reported? Taking?    EPINEPHrine (EPIPEN) 0 3 mg/0 3 mL SOAJ   No No   Sig: Inject 0 3 mL (0 3 mg total) into a muscle once for 1 dose   albuterol (5 mg/mL) 0 5 % nebulizer solution   No No   Sig: Take 0 5 mL (2 5 mg total) by nebulization every 6 (six) hours as needed for wheezing   albuterol (PROVENTIL HFA,VENTOLIN HFA) 90 mcg/act inhaler   No No   Sig: Inhale 2 puffs every 4 (four) hours as needed for wheezing   loratadine (CLARITIN) 10 mg tablet   No No   Sig: Take 1 tablet (10 mg total) by mouth daily   Patient not taking: No sig reported   naproxen (NAPROSYN) 500 mg tablet   No No   Sig: Take 1 tablet (500 mg total) by mouth 2 (two) times a day with meals   omeprazole (PriLOSEC) 20 mg delayed release capsule   No No   Sig: Take 1 capsule (20 mg total) by mouth daily   Patient not taking: No sig reported   ondansetron (ZOFRAN) 4 mg tablet   No No Sig: Take 1 tablet (4 mg total) by mouth every 8 (eight) hours as needed for nausea   Patient not taking: No sig reported      Facility-Administered Medications: None       Past Medical History:   Diagnosis Date   • Asthma    • No known problems        History reviewed  No pertinent surgical history  History reviewed  No pertinent family history  I have reviewed and agree with the history as documented  E-Cigarette/Vaping   • E-Cigarette Use Never User      E-Cigarette/Vaping Substances   • Nicotine No    • THC No    • CBD No    • Flavoring No    • Other No    • Unknown No      Social History     Tobacco Use   • Smoking status: Never   • Smokeless tobacco: Never   Vaping Use   • Vaping Use: Never used   Substance Use Topics   • Alcohol use: Never   • Drug use: Never       Review of Systems   Constitutional: Negative for chills and fever  HENT: Negative for congestion, ear pain and sore throat  Eyes: Negative for pain and visual disturbance  Respiratory: Negative for cough and shortness of breath  Cardiovascular: Negative for chest pain and palpitations  Gastrointestinal: Negative for abdominal pain, constipation, diarrhea, nausea and vomiting  Genitourinary: Positive for vaginal bleeding  Negative for decreased urine volume, dysuria, hematuria, urgency and vaginal discharge  Musculoskeletal: Negative for arthralgias and back pain  Skin: Negative for color change and rash  Neurological: Positive for light-headedness  Negative for dizziness, seizures, syncope, facial asymmetry and headaches  All other systems reviewed and are negative  Physical Exam  Physical Exam  Vitals and nursing note reviewed  Constitutional:       General: She is not in acute distress  Appearance: Normal appearance  She is not ill-appearing or toxic-appearing  HENT:      Head: Normocephalic and atraumatic        Right Ear: External ear normal       Left Ear: External ear normal       Nose: Nose normal  Mouth/Throat:      Mouth: Mucous membranes are moist       Pharynx: No posterior oropharyngeal erythema  Eyes:      General: No scleral icterus  Right eye: No discharge  Left eye: No discharge  Extraocular Movements: Extraocular movements intact  Conjunctiva/sclera: Conjunctivae normal    Cardiovascular:      Rate and Rhythm: Normal rate and regular rhythm  Pulses: Normal pulses  Heart sounds: Normal heart sounds  Pulmonary:      Effort: Pulmonary effort is normal  No respiratory distress  Breath sounds: Normal breath sounds  Abdominal:      Palpations: Abdomen is soft  Tenderness: There is no abdominal tenderness  Genitourinary:     Comments: Deferred  Musculoskeletal:         General: No tenderness, deformity or signs of injury  Cervical back: Normal range of motion and neck supple  Skin:     General: Skin is dry  Coloration: Skin is not jaundiced  Findings: No erythema or rash  Neurological:      General: No focal deficit present  Mental Status: She is alert and oriented to person, place, and time  Mental status is at baseline  Motor: No weakness  Gait: Gait normal    Psychiatric:         Mood and Affect: Mood normal          Behavior: Behavior normal          Thought Content:  Thought content normal          Vital Signs  ED Triage Vitals [06/12/23 0919]   Temperature Pulse Respirations Blood Pressure SpO2   98 1 °F (36 7 °C) 77 19 112/70 99 %      Temp Source Heart Rate Source Patient Position - Orthostatic VS BP Location FiO2 (%)   Oral Monitor -- -- --      Pain Score       No Pain           Vitals:    06/12/23 0919   BP: 112/70   Pulse: 77         Visual Acuity      ED Medications  Medications   sodium chloride 0 9 % bolus 1,000 mL (1,000 mL Intravenous New Bag 6/12/23 1115)       Diagnostic Studies  Results Reviewed     Procedure Component Value Units Date/Time    Protime-INR [[id number]]  (Normal) Collected: 06/12/23 1114    Lab Status: Final result Specimen: Blood from Arm, Right Updated: 06/12/23 1141     Protime 12 6 seconds      INR 0 94    APTT [[id number]]  (Normal) Collected: 06/12/23 1114    Lab Status: Final result Specimen: Blood from Arm, Right Updated: 06/12/23 1141     PTT 30 seconds     Comprehensive metabolic panel [[id number]] Collected: 06/12/23 1114    Lab Status: Final result Specimen: Blood from Arm, Right Updated: 06/12/23 1138     Sodium 137 mmol/L      Potassium 3 8 mmol/L      Chloride 107 mmol/L      CO2 25 mmol/L      ANION GAP 5 mmol/L      BUN 15 mg/dL      Creatinine 0 66 mg/dL      Glucose 81 mg/dL      Calcium 8 9 mg/dL      AST 20 U/L      ALT 13 U/L      Alkaline Phosphatase 64 U/L      Total Protein 7 5 g/dL      Albumin 4 3 g/dL      Total Bilirubin 0 68 mg/dL      eGFR 114 ml/min/1 73sq m     Narrative:      [place] guidelines for Chronic Kidney Disease (CKD):   •  Stage 1 with normal or high GFR (GFR > 90 mL/min/1 73 square meters)  •  Stage 2 Mild CKD (GFR = 60-89 mL/min/1 73 square meters)  •  Stage 3A Moderate CKD (GFR = 45-59 mL/min/1 73 square meters)  •  Stage 3B Moderate CKD (GFR = 30-44 mL/min/1 73 square meters)  •  Stage 4 Severe CKD (GFR = 15-29 mL/min/1 73 square meters)  •  Stage 5 End Stage CKD (GFR <15 mL/min/1 73 square meters)  Note: GFR calculation is accurate only with a steady state creatinine    CBC and differential [[id number]]  (Abnormal) Collected: 06/12/23 1114    Lab Status: Final result Specimen: Blood from Arm, Right Updated: 06/12/23 1122     WBC 4 11 Thousand/uL      RBC 4 53 Million/uL      Hemoglobin 13 7 g/dL      Hematocrit 42 6 %      MCV 94 fL      MCH 30 2 pg      MCHC 32 2 g/dL      RDW 13 1 %      MPV 11 0 fL      Platelets 840 Thousands/uL      nRBC 0 /100 WBCs      Neutrophils Relative 61 %      Immat GRANS % 0 %      Lymphocytes Relative 29 %      Monocytes Relative 7 %      Eosinophils Relative 2 %      Basophils Relative 1 % Neutrophils Absolute 2 49 Thousands/µL      Immature Grans Absolute 0 01 Thousand/uL      Lymphocytes Absolute 1 20 Thousands/µL      Monocytes Absolute 0 29 Thousand/µL      Eosinophils Absolute 0 10 Thousand/µL      Basophils Absolute 0 02 Thousands/µL     POCT pregnancy, urine [[id number]]  (Normal) Resulted: 06/12/23 1013    Lab Status: Final result Updated: 06/12/23 1014     EXT Preg Test, Ur Negative     Control Valid    Urine Microscopic [[id number]]  (Abnormal) Collected: 06/12/23 0938    Lab Status: Final result Specimen: Urine, Clean Catch Updated: 06/12/23 1007     RBC, UA 4-10 /hpf      WBC, UA 4-10 /hpf      Epithelial Cells Moderate /hpf      Bacteria, UA Occasional /hpf      MUCUS THREADS Innumerable    UA w Reflex to Microscopic w Reflex to Culture [[id number]]  (Abnormal) Collected: 06/12/23 0938    Lab Status: Final result Specimen: Urine, Clean Catch Updated: 06/12/23 0955     Color, UA Light Orange     Clarity, UA Clear     Specific [place], UA 1 030     pH, UA 5 5     Leukocytes, UA Negative     Nitrite, UA Negative     Protein, UA 30 (1+) mg/dl      Glucose, UA Negative mg/dl      Ketones, UA Negative mg/dl      Urobilinogen, UA <2 0 mg/dl      Bilirubin, UA Negative     Occult Blood, UA Large                 US pelvis complete w transvaginal   Final Result by Ree Yarbrough MD (06/12 1225)   Retroverted, otherwise unremarkable uterus  Polycystic right ovarian appearance  Partially collapsed left corpus luteal cyst  Otherwise unremarkable left ovary  Trace free fluid, possibly physiologic  Workstation performed: [id number]                    Procedures  Procedures         ED Course  ED Course as of 06/12/23 1355   Mon Jun 12, 2023   1004 UA w Reflex to Microscopic w Reflex to Culture(!)  Large blood, negative leuks and nitrites  1018 PREGNANCY TEST URINE: Negative   1127 CBC and differential(!)  No anemia, leukocytosis     1140 Comprehensive metabolic panel  Normal    1144 Coags normal    1254 Patient feeling better  Will make sure she can ambulate steady before discharge  Will check in with OB    1329 D/w OB resident Olga Johnson with discharge and outpatient follow up  Medical Decision Making  Patient is a 72-year-old female with history of asthma presenting for evaluation of abnormal vaginal bleeding  States she started her period yesterday but amount of blood is significantly more than her normal menstrual period, reports going through about 1 pad an hour  She has associated lightheadedness and fatigue  No abdominal pain or other symptoms  No concern for STIs  Vitals are within normal limits on arrival to the ED  Physical exam is unremarkable  Heart lung sounds normal   Abdomen is soft, nontender  DDx including but not limited to pregnancy, miscarriage, menses, dysfunctional uterine bleeding, structural cause, coagulopathy  Will obtain CBC, CMP, coags, T&S, UA, urine pregnancy, pelvic ultrasound  Will give liter of fluids given that patient complaining of lightheadedness  Labs negative for leukocytosis, anemia, electrolyte abnormality, LIDIA  Coags normal   UA shows blood but no leukocytes or nitrates  Urine hCG negative  Ultrasound shows polycystic right ovary and partially collapsed left corpus luteal cyst, no other findings  Patient reports improvement in symptoms following liter of fluids  Discussed with OB who is okay with discharge and outpatient follow-up  I have discussed findings and plan for discharge with the patient/caregiver  Follow up with the appropriate providers including primary care physician was discussed  Return precautions discussed with patient/caregiver as outlined in AVS  Patient/caregiver verbally expressed understanding  Patient stable at time of discharge and ambulated out of the emergency department         Vaginal bleeding: acute illness or injury  Amount and/or Complexity of Data Reviewed  Labs: ordered  Decision-making details documented in ED Course  Radiology: ordered  Disposition  Final diagnoses:   Vaginal bleeding     Time reflects when diagnosis was documented in both MDM as applicable and the Disposition within this note     Time User Action Codes Description Comment    6/12/2023  1:39 PM Jadine Check Add [N93 9] Vaginal bleeding       ED Disposition     ED Disposition   Discharge    Condition   Stable    Date/Time   Mon Jun 12, 2023  1:39 PM    Comment   Floydene Hammed discharge to home/self care  Follow-up Information     Follow up With Specialties Details Why [facility] Follow up Please make an appointment to establish ObGyn care, either with a [address] [place] [address] or with [facility]  With that provider, you can also address heavy menstrual bleeding, if desired   [address] [place] [address], [address] [place] [address], [address] [place] [address], [address] [phone]          Patient's Medications   Discharge Prescriptions    No medications on file           PDMP Review     None          ED Provider  Electronically Signed by           Luis Fernando Lazaro PA-C  06/12/23 5093

## 2023-06-21 ENCOUNTER — HOSPITAL ENCOUNTER (EMERGENCY)
Facility: HOSPITAL | Age: 38
Discharge: HOME | End: 2023-06-21
Attending: EMERGENCY MEDICINE
Payer: COMMERCIAL

## 2023-06-21 VITALS
SYSTOLIC BLOOD PRESSURE: 119 MMHG | TEMPERATURE: 98.2 F | OXYGEN SATURATION: 98 % | DIASTOLIC BLOOD PRESSURE: 64 MMHG | WEIGHT: 178.57 LBS | HEART RATE: 83 BPM | BODY MASS INDEX: 27.97 KG/M2 | RESPIRATION RATE: 16 BRPM

## 2023-06-21 DIAGNOSIS — J02.9 PHARYNGITIS: Primary | ICD-10-CM

## 2023-06-21 LAB — S PYO DNA THROAT QL NAA+PROBE: DETECTED

## 2023-06-21 PROCEDURE — 87651 STREP A DNA AMP PROBE: CPT | Performed by: PHYSICIAN ASSISTANT

## 2023-06-21 RX ORDER — AMOXICILLIN 250 MG/1
500 CAPSULE ORAL ONCE
Status: COMPLETED | OUTPATIENT
Start: 2023-06-21 | End: 2023-06-21

## 2023-06-21 RX ORDER — AMOXICILLIN 500 MG/1
500 CAPSULE ORAL EVERY 12 HOURS SCHEDULED
Qty: 20 CAPSULE | Refills: 0 | Status: SHIPPED | OUTPATIENT
Start: 2023-06-21 | End: 2023-07-01

## 2023-06-21 RX ADMIN — AMOXICILLIN 500 MG: 250 CAPSULE ORAL at 11:54

## 2023-06-21 RX ADMIN — DEXAMETHASONE SODIUM PHOSPHATE 10 MG: 10 INJECTION, SOLUTION INTRAMUSCULAR; INTRAVENOUS at 11:54

## 2023-06-21 NOTE — ED PROVIDER NOTES
History  Chief Complaint   Patient presents with   • Sore Throat     Pt c/o sorethroat since Sunday with L ear pain that started yesterday  Pt denies fevers, N/V/D     Patient is a 74-year-old female with a past medical history of asthma who presents for evaluation of sore throat  She states her symptoms started about 3 days ago  She states associated symptoms are mild headache, pain with swallowing, occasional runny nose  Pain can radiate to the ear  She denies any known sick contacts or exposures  She denies fever, chest pain, shortness of breath, abdominal pain, nausea vomiting or diarrhea, neck stiffness, rash, cough, ear drainage  Prior to Admission Medications   Prescriptions Last Dose Informant Patient Reported? Taking? EPINEPHrine (EPIPEN) 0 3 mg/0 3 mL SOAJ   No No   Sig: Inject 0 3 mL (0 3 mg total) into a muscle once for 1 dose   albuterol (5 mg/mL) 0 5 % nebulizer solution   No No   Sig: Take 0 5 mL (2 5 mg total) by nebulization every 6 (six) hours as needed for wheezing   albuterol (PROVENTIL HFA,VENTOLIN HFA) 90 mcg/act inhaler   No No   Sig: Inhale 2 puffs every 4 (four) hours as needed for wheezing   loratadine (CLARITIN) 10 mg tablet   No No   Sig: Take 1 tablet (10 mg total) by mouth daily   Patient not taking: No sig reported   naproxen (NAPROSYN) 500 mg tablet   No No   Sig: Take 1 tablet (500 mg total) by mouth 2 (two) times a day with meals   omeprazole (PriLOSEC) 20 mg delayed release capsule   No No   Sig: Take 1 capsule (20 mg total) by mouth daily   Patient not taking: No sig reported   ondansetron (ZOFRAN) 4 mg tablet   No No   Sig: Take 1 tablet (4 mg total) by mouth every 8 (eight) hours as needed for nausea   Patient not taking: No sig reported      Facility-Administered Medications: None       Past Medical History:   Diagnosis Date   • Asthma    • No known problems        History reviewed  No pertinent surgical history  History reviewed   No pertinent family history  I have reviewed and agree with the history as documented  E-Cigarette/Vaping   • E-Cigarette Use Never User      E-Cigarette/Vaping Substances   • Nicotine No    • THC No    • CBD No    • Flavoring No    • Other No    • Unknown No      Social History     Tobacco Use   • Smoking status: Never   • Smokeless tobacco: Never   Vaping Use   • Vaping Use: Never used   Substance Use Topics   • Alcohol use: Never   • Drug use: Never       Review of Systems   Constitutional: Positive for appetite change  Negative for chills and fever  HENT: Positive for rhinorrhea and sore throat  Negative for congestion, ear pain, facial swelling, sinus pain and voice change  Eyes: Negative for pain and visual disturbance  Respiratory: Negative for cough and shortness of breath  Cardiovascular: Negative for chest pain and palpitations  Gastrointestinal: Negative for abdominal pain, diarrhea, nausea and vomiting  Genitourinary: Negative for difficulty urinating  Musculoskeletal: Negative for arthralgias, neck pain and neck stiffness  Skin: Negative for rash  Neurological: Positive for headaches  Negative for syncope  All other systems reviewed and are negative  Physical Exam  Physical Exam  Vitals and nursing note reviewed  Constitutional:       General: She is not in acute distress  Appearance: Normal appearance  She is well-developed and normal weight  She is not ill-appearing, toxic-appearing or diaphoretic  HENT:      Head: Normocephalic and atraumatic  Right Ear: Tympanic membrane, ear canal and external ear normal       Left Ear: Tympanic membrane, ear canal and external ear normal       Nose: Nose normal       Mouth/Throat:      Mouth: Mucous membranes are moist  No oral lesions  Dentition: No gum lesions  Pharynx: Uvula midline  Posterior oropharyngeal erythema present  No pharyngeal swelling, oropharyngeal exudate or uvula swelling  Tonsils:  Tonsillar exudate present  No tonsillar abscesses  3+ on the right  3+ on the left  Comments: Posterior oropharynx and tonsils with erythema  Tonsils are symmetrically enlarged and 3+ bilaterally  There is tonsillar exudate noted  No sign of peritonsillar abscess  Patient handles oral secretions well  Eyes:      Conjunctiva/sclera: Conjunctivae normal    Cardiovascular:      Rate and Rhythm: Normal rate and regular rhythm  Heart sounds: Normal heart sounds  No murmur heard  Pulmonary:      Effort: Pulmonary effort is normal  No respiratory distress  Breath sounds: Normal breath sounds  No stridor  No wheezing, rhonchi or rales  Abdominal:      General: Abdomen is flat  Bowel sounds are normal  There is no distension  Palpations: Abdomen is soft  Tenderness: There is no abdominal tenderness  Musculoskeletal:         General: No swelling  Cervical back: Normal range of motion and neck supple  No rigidity  Lymphadenopathy:      Cervical: Cervical adenopathy present  Skin:     General: Skin is warm and dry  Capillary Refill: Capillary refill takes less than 2 seconds  Neurological:      Mental Status: She is alert     Psychiatric:         Mood and Affect: Mood normal          Vital Signs  ED Triage Vitals   Temperature Pulse Respirations Blood Pressure SpO2   06/21/23 1128 06/21/23 1125 06/21/23 1125 06/21/23 1125 06/21/23 1125   98 2 °F (36 8 °C) 83 16 119/64 98 %      Temp Source Heart Rate Source Patient Position - Orthostatic VS BP Location FiO2 (%)   06/21/23 1128 -- 06/21/23 1125 06/21/23 1125 --   Oral  Sitting Right arm       Pain Score       --                  Vitals:    06/21/23 1125   BP: 119/64   Pulse: 83   Patient Position - Orthostatic VS: Sitting         Visual Acuity      ED Medications  Medications   dexamethasone oral liquid 10 mg 1 mL (10 mg Oral Given 6/21/23 1154)   amoxicillin (AMOXIL) capsule 500 mg (500 mg Oral Given 6/21/23 1154)       Diagnostic Studies  Results Reviewed     Procedure Component Value Units Date/Time    Strep A PCR [[id number]] Collected: 06/21/23 1153    Lab Status: In process Specimen: Throat Updated: 06/21/23 1158                 No orders to display              Procedures  Procedures         ED Course                                             Medical Decision Making  Patient is a 53-year-old female who presents for evaluation of sore throat x3 days  Associated symptoms include decreased appetite due to pain with swallowing, mild headache, tender cervical lymph nodes, mild runny nose  Will send strep test   Explained to patient that test results can take 2-5 hrs and she will be contacted with positive results  Will treat empirically for strep pharyngitis based on [facility] criteria  Will give dose of decadron and amoxicillin in ED and send rx for amoxicillin to the pharmacy  Discussed follow up with family doctor  Discussed strict return precautions if symptoms worsen or new symptoms arise  Patient states understanding and agrees with plan  Pharyngitis: acute illness or injury  Amount and/or Complexity of Data Reviewed  Labs: ordered  Risk  Prescription drug management  Disposition  Final diagnoses:   Pharyngitis     Time reflects when diagnosis was documented in both MDM as applicable and the Disposition within this note     Time User Action Codes Description Comment    6/21/2023 11:50 AM Kylah Sosa Add [J02 9] Pharyngitis       ED Disposition     ED Disposition   Discharge    Condition   Stable    Date/Time   Wed Jun 21, 2023 11:50 AM    Comment   Allison Law discharge to home/self care                 Follow-up Information     Follow up With Specialties Details Why Contact Info Additional [id number] [facility] Schedule an appointment as soon as possible for a visit in 1 day  [facility] [address]  [facility] Emergency Medicine  If symptoms worsen [facility] [id number]  [address] [facility] Emergency Department, [address]  , [place], [place], [address]          Discharge Medication List as of 6/21/2023 11:53 AM      START taking these medications    Details   amoxicillin (AMOXIL) 500 mg capsule Take 1 capsule (500 mg total) by mouth every 12 (twelve) hours for 10 days, Starting Wed 6/21/2023, Until Sat 7/1/2023, Normal         CONTINUE these medications which have NOT CHANGED    Details   albuterol (5 mg/mL) 0 5 % nebulizer solution Take 0 5 mL (2 5 mg total) by nebulization every 6 (six) hours as needed for wheezing, Starting Tue 11/30/2021, Normal      albuterol (PROVENTIL HFA,VENTOLIN HFA) 90 mcg/act inhaler Inhale 2 puffs every 4 (four) hours as needed for wheezing, Starting Tue 11/30/2021, Normal      EPINEPHrine (EPIPEN) 0 3 mg/0 3 mL SOAJ Inject 0 3 mL (0 3 mg total) into a muscle once for 1 dose, Starting Wed 11/9/2022, Normal      loratadine (CLARITIN) 10 mg tablet Take 1 tablet (10 mg total) by mouth daily, Starting Tue 10/26/2021, Normal      naproxen (NAPROSYN) 500 mg tablet Take 1 tablet (500 mg total) by mouth 2 (two) times a day with meals, Starting Tue 2/14/2023, Normal      omeprazole (PriLOSEC) 20 mg delayed release capsule Take 1 capsule (20 mg total) by mouth daily, Starting Wed 8/10/2022, Normal      ondansetron (ZOFRAN) 4 mg tablet Take 1 tablet (4 mg total) by mouth every 8 (eight) hours as needed for nausea, Starting Wed 8/10/2022, Normal             No discharge procedures on file      PDMP Review     None          ED Provider  Electronically Signed by           Alejandra Alvarez PA-C  06/21/23 [id number]

## 2023-06-21 NOTE — Clinical Note
Kadencoco Hoyt was seen and treated in our emergency department on 6/21/2023  Diagnosis:     Dahlia    She may return on this date: 06/22/2023         If you have any questions or concerns, please don't hesitate to call        Christina Kam PA-C    ______________________________           _______________          _______________  Hospital Representative                              Date                                Time

## 2023-08-27 ENCOUNTER — HOSPITAL ENCOUNTER (EMERGENCY)
Facility: HOSPITAL | Age: 38
Discharge: HOME | End: 2023-08-27
Attending: EMERGENCY MEDICINE
Payer: COMMERCIAL

## 2023-08-27 ENCOUNTER — APPOINTMENT (EMERGENCY)
Dept: RADIOLOGY | Facility: HOSPITAL | Age: 38
End: 2023-08-27
Payer: COMMERCIAL

## 2023-08-27 VITALS
RESPIRATION RATE: 20 BRPM | SYSTOLIC BLOOD PRESSURE: 138 MMHG | OXYGEN SATURATION: 98 % | HEART RATE: 83 BPM | WEIGHT: 178.79 LBS | BODY MASS INDEX: 28 KG/M2 | DIASTOLIC BLOOD PRESSURE: 89 MMHG | TEMPERATURE: 98.3 F

## 2023-08-27 DIAGNOSIS — R10.9 ABDOMINAL PAIN: Primary | ICD-10-CM

## 2023-08-27 LAB
ALBUMIN SERPL BCP-MCNC: 4.2 G/DL (ref 3.5–5)
ALP SERPL-CCNC: 72 U/L (ref 34–104)
ALT SERPL W P-5'-P-CCNC: 12 U/L (ref 7–52)
ANION GAP SERPL CALCULATED.3IONS-SCNC: 5 MMOL/L
AST SERPL W P-5'-P-CCNC: 18 U/L (ref 13–39)
ATRIAL RATE: 68 BPM
BACTERIA UR QL AUTO: ABNORMAL /HPF
BASOPHILS # BLD MANUAL: 0 THOUSAND/UL (ref 0–0.1)
BASOPHILS NFR MAR MANUAL: 0 % (ref 0–1)
BILIRUB DIRECT SERPL-MCNC: 0.13 MG/DL (ref 0–0.2)
BILIRUB SERPL-MCNC: 0.5 MG/DL (ref 0.2–1)
BILIRUB UR QL STRIP: NEGATIVE
BUN SERPL-MCNC: 11 MG/DL (ref 5–25)
CALCIUM SERPL-MCNC: 9.1 MG/DL (ref 8.4–10.2)
CARDIAC TROPONIN I PNL SERPL HS: <2 NG/L
CHLORIDE SERPL-SCNC: 107 MMOL/L (ref 96–108)
CLARITY UR: CLEAR
CO2 SERPL-SCNC: 27 MMOL/L (ref 21–32)
COLOR UR: YELLOW
CREAT SERPL-MCNC: 0.64 MG/DL (ref 0.6–1.3)
EOSINOPHIL # BLD MANUAL: 0 THOUSAND/UL (ref 0–0.4)
EOSINOPHIL NFR BLD MANUAL: 0 % (ref 0–6)
ERYTHROCYTE [DISTWIDTH] IN BLOOD BY AUTOMATED COUNT: 13.3 % (ref 11.6–15.1)
EXT PREGNANCY TEST URINE: NEGATIVE
EXT. CONTROL: NORMAL
GFR SERPL CREATININE-BSD FRML MDRD: 115 ML/MIN/1.73SQ M
GLUCOSE SERPL-MCNC: 105 MG/DL (ref 65–140)
GLUCOSE UR STRIP-MCNC: NEGATIVE MG/DL
HCT VFR BLD AUTO: 40.5 % (ref 34.8–46.1)
HGB BLD-MCNC: 12.8 G/DL (ref 11.5–15.4)
HGB UR QL STRIP.AUTO: NEGATIVE
KETONES UR STRIP-MCNC: NEGATIVE MG/DL
LEUKOCYTE ESTERASE UR QL STRIP: NEGATIVE
LIPASE SERPL-CCNC: 11 U/L (ref 11–82)
LYMPHOCYTES # BLD AUTO: 0.59 THOUSAND/UL (ref 0.6–4.47)
LYMPHOCYTES # BLD AUTO: 8 % (ref 14–44)
MAGNESIUM SERPL-MCNC: 1.9 MG/DL (ref 1.9–2.7)
MCH RBC QN AUTO: 29.3 PG (ref 26.8–34.3)
MCHC RBC AUTO-ENTMCNC: 31.6 G/DL (ref 31.4–37.4)
MCV RBC AUTO: 93 FL (ref 82–98)
MONOCYTES # BLD AUTO: 0.07 THOUSAND/UL (ref 0–1.22)
MONOCYTES NFR BLD: 1 % (ref 4–12)
MUCOUS THREADS UR QL AUTO: ABNORMAL
NEUTROPHILS # BLD MANUAL: 6.74 THOUSAND/UL (ref 1.85–7.62)
NEUTS BAND NFR BLD MANUAL: 2 % (ref 0–8)
NEUTS SEG NFR BLD AUTO: 89 % (ref 43–75)
NITRITE UR QL STRIP: NEGATIVE
NON-SQ EPI CELLS URNS QL MICRO: ABNORMAL /HPF
P AXIS: -16 DEGREES
PH UR STRIP.AUTO: >=9 [PH] (ref 4.5–8)
PLATELET # BLD AUTO: 309 THOUSANDS/UL (ref 149–390)
PLATELET BLD QL SMEAR: ADEQUATE
PMV BLD AUTO: 10.7 FL (ref 8.9–12.7)
POTASSIUM SERPL-SCNC: 3.9 MMOL/L (ref 3.5–5.3)
PR INTERVAL: 126 MS
PROT SERPL-MCNC: 7.4 G/DL (ref 6.4–8.4)
PROT UR STRIP-MCNC: ABNORMAL MG/DL
QRS AXIS: 80 DEGREES
QRSD INTERVAL: 100 MS
QT INTERVAL: 394 MS
QTC INTERVAL: 418 MS
RBC # BLD AUTO: 4.37 MILLION/UL (ref 3.81–5.12)
RBC #/AREA URNS AUTO: ABNORMAL /HPF
RBC MORPH BLD: NORMAL
SODIUM SERPL-SCNC: 139 MMOL/L (ref 135–147)
SP GR UR STRIP.AUTO: 1.01 (ref 1–1.03)
T WAVE AXIS: 58 DEGREES
UROBILINOGEN UR QL STRIP.AUTO: 0.2 E.U./DL
VENTRICULAR RATE: 68 BPM
WBC # BLD AUTO: 7.41 THOUSAND/UL (ref 4.31–10.16)
WBC #/AREA URNS AUTO: ABNORMAL /HPF

## 2023-08-27 PROCEDURE — 85007 BL SMEAR W/DIFF WBC COUNT: CPT | Performed by: EMERGENCY MEDICINE

## 2023-08-27 PROCEDURE — 93010 ELECTROCARDIOGRAM REPORT: CPT | Performed by: INTERNAL MEDICINE

## 2023-08-27 PROCEDURE — 36415 COLL VENOUS BLD VENIPUNCTURE: CPT | Performed by: EMERGENCY MEDICINE

## 2023-08-27 PROCEDURE — 81025 URINE PREGNANCY TEST: CPT | Performed by: EMERGENCY MEDICINE

## 2023-08-27 PROCEDURE — 96374 THER/PROPH/DIAG INJ IV PUSH: CPT

## 2023-08-27 PROCEDURE — 99285 EMERGENCY DEPT VISIT HI MDM: CPT

## 2023-08-27 PROCEDURE — 81001 URINALYSIS AUTO W/SCOPE: CPT

## 2023-08-27 PROCEDURE — 83690 ASSAY OF LIPASE: CPT | Performed by: EMERGENCY MEDICINE

## 2023-08-27 PROCEDURE — 83735 ASSAY OF MAGNESIUM: CPT | Performed by: EMERGENCY MEDICINE

## 2023-08-27 PROCEDURE — 84484 ASSAY OF TROPONIN QUANT: CPT | Performed by: EMERGENCY MEDICINE

## 2023-08-27 PROCEDURE — 99285 EMERGENCY DEPT VISIT HI MDM: CPT | Performed by: EMERGENCY MEDICINE

## 2023-08-27 PROCEDURE — 93005 ELECTROCARDIOGRAM TRACING: CPT

## 2023-08-27 PROCEDURE — 96375 TX/PRO/DX INJ NEW DRUG ADDON: CPT

## 2023-08-27 PROCEDURE — 96361 HYDRATE IV INFUSION ADD-ON: CPT

## 2023-08-27 PROCEDURE — 80076 HEPATIC FUNCTION PANEL: CPT | Performed by: EMERGENCY MEDICINE

## 2023-08-27 PROCEDURE — 85027 COMPLETE CBC AUTOMATED: CPT | Performed by: EMERGENCY MEDICINE

## 2023-08-27 PROCEDURE — 80048 BASIC METABOLIC PNL TOTAL CA: CPT | Performed by: EMERGENCY MEDICINE

## 2023-08-27 PROCEDURE — 71046 X-RAY EXAM CHEST 2 VIEWS: CPT

## 2023-08-27 RX ORDER — FAMOTIDINE 10 MG/ML
20 INJECTION, SOLUTION INTRAVENOUS ONCE
Status: COMPLETED | OUTPATIENT
Start: 2023-08-27 | End: 2023-08-27

## 2023-08-27 RX ORDER — FAMOTIDINE 20 MG/1
20 TABLET, FILM COATED ORAL 2 TIMES DAILY
Qty: 30 TABLET | Refills: 0 | Status: SHIPPED | OUTPATIENT
Start: 2023-08-27

## 2023-08-27 RX ORDER — ONDANSETRON 4 MG/1
4 TABLET, ORALLY DISINTEGRATING ORAL EVERY 6 HOURS PRN
Qty: 20 TABLET | Refills: 0 | Status: SHIPPED | OUTPATIENT
Start: 2023-08-27

## 2023-08-27 RX ORDER — SUCRALFATE 1 G/1
1 TABLET ORAL 4 TIMES DAILY
Qty: 40 TABLET | Refills: 0 | Status: SHIPPED | OUTPATIENT
Start: 2023-08-27

## 2023-08-27 RX ORDER — KETOROLAC TROMETHAMINE 30 MG/ML
15 INJECTION, SOLUTION INTRAMUSCULAR; INTRAVENOUS ONCE
Status: COMPLETED | OUTPATIENT
Start: 2023-08-27 | End: 2023-08-27

## 2023-08-27 RX ORDER — DROPERIDOL 2.5 MG/ML
0.62 INJECTION, SOLUTION INTRAMUSCULAR; INTRAVENOUS ONCE
Status: COMPLETED | OUTPATIENT
Start: 2023-08-27 | End: 2023-08-27

## 2023-08-27 RX ADMIN — SODIUM CHLORIDE 1000 ML: 0.9 INJECTION, SOLUTION INTRAVENOUS at 09:20

## 2023-08-27 RX ADMIN — FAMOTIDINE 20 MG: 10 INJECTION INTRAVENOUS at 09:51

## 2023-08-27 RX ADMIN — KETOROLAC TROMETHAMINE 15 MG: 30 INJECTION, SOLUTION INTRAMUSCULAR; INTRAVENOUS at 09:51

## 2023-08-27 RX ADMIN — DROPERIDOL 0.62 MG: 2.5 INJECTION, SOLUTION INTRAMUSCULAR; INTRAVENOUS at 09:58

## 2023-08-27 NOTE — DISCHARGE INSTRUCTIONS
Take the pepcid regularly, twice daily. Use the carafate 15 minutes before meals, this is a coating agent to help protect your stomach. This may work better if crushed up and added to a small amount of water. Take the zofran as needed for nausea, this can be placed under the tongue to dissolve if you are vomiting. The number for GI is included in these discharge instructions, call to be seen in the office for further evaluation and management.

## 2024-01-09 ENCOUNTER — HOSPITAL ENCOUNTER (EMERGENCY)
Facility: HOSPITAL | Age: 39
Discharge: HOME | End: 2024-01-09
Attending: EMERGENCY MEDICINE
Payer: COMMERCIAL

## 2024-01-09 ENCOUNTER — APPOINTMENT (EMERGENCY)
Dept: RADIOLOGY | Facility: HOSPITAL | Age: 39
End: 2024-01-09
Payer: COMMERCIAL

## 2024-01-09 VITALS
HEART RATE: 94 BPM | BODY MASS INDEX: 29.04 KG/M2 | OXYGEN SATURATION: 97 % | SYSTOLIC BLOOD PRESSURE: 116 MMHG | TEMPERATURE: 99.1 F | RESPIRATION RATE: 18 BRPM | DIASTOLIC BLOOD PRESSURE: 70 MMHG | WEIGHT: 185.41 LBS

## 2024-01-09 DIAGNOSIS — J45.909 ASTHMA, UNSPECIFIED ASTHMA SEVERITY, UNSPECIFIED WHETHER COMPLICATED, UNSPECIFIED WHETHER PERSISTENT: ICD-10-CM

## 2024-01-09 DIAGNOSIS — J10.1 INFLUENZA A: Primary | ICD-10-CM

## 2024-01-09 DIAGNOSIS — J06.9 VIRAL URI WITH COUGH: ICD-10-CM

## 2024-01-09 LAB
FLUAV RNA RESP QL NAA+PROBE: POSITIVE
FLUBV RNA RESP QL NAA+PROBE: NEGATIVE
RSV RNA RESP QL NAA+PROBE: NEGATIVE
SARS-COV-2 RNA RESP QL NAA+PROBE: NEGATIVE

## 2024-01-09 PROCEDURE — 99284 EMERGENCY DEPT VISIT MOD MDM: CPT

## 2024-01-09 PROCEDURE — 0241U HB NFCT DS VIR RESP RNA 4 TRGT: CPT

## 2024-01-09 PROCEDURE — 99284 EMERGENCY DEPT VISIT MOD MDM: CPT | Performed by: EMERGENCY MEDICINE

## 2024-01-09 PROCEDURE — 94640 AIRWAY INHALATION TREATMENT: CPT

## 2024-01-09 PROCEDURE — 71046 X-RAY EXAM CHEST 2 VIEWS: CPT

## 2024-01-09 RX ORDER — OSELTAMIVIR PHOSPHATE 75 MG/1
75 CAPSULE ORAL 2 TIMES DAILY
Qty: 10 CAPSULE | Refills: 0 | Status: SHIPPED | OUTPATIENT
Start: 2024-01-09 | End: 2024-01-14

## 2024-01-09 RX ORDER — IPRATROPIUM BROMIDE AND ALBUTEROL SULFATE 2.5; .5 MG/3ML; MG/3ML
3 SOLUTION RESPIRATORY (INHALATION)
Status: DISCONTINUED | OUTPATIENT
Start: 2024-01-09 | End: 2024-01-09 | Stop reason: HOSPADM

## 2024-01-09 RX ORDER — ALBUTEROL SULFATE 90 UG/1
1-2 AEROSOL, METERED RESPIRATORY (INHALATION) EVERY 6 HOURS PRN
Qty: 6.7 G | Refills: 0 | Status: SHIPPED | OUTPATIENT
Start: 2024-01-09

## 2024-01-09 RX ORDER — IBUPROFEN 600 MG/1
600 TABLET ORAL ONCE
Status: DISCONTINUED | OUTPATIENT
Start: 2024-01-09 | End: 2024-01-09

## 2024-01-09 RX ORDER — PREDNISONE 20 MG/1
40 TABLET ORAL DAILY
Qty: 10 TABLET | Refills: 0 | Status: SHIPPED | OUTPATIENT
Start: 2024-01-09 | End: 2024-01-14

## 2024-01-09 RX ORDER — ACETAMINOPHEN 325 MG/1
650 TABLET ORAL ONCE
Status: COMPLETED | OUTPATIENT
Start: 2024-01-09 | End: 2024-01-09

## 2024-01-09 RX ADMIN — IPRATROPIUM BROMIDE AND ALBUTEROL SULFATE 3 ML: 2.5; .5 SOLUTION RESPIRATORY (INHALATION) at 09:44

## 2024-01-09 RX ADMIN — ACETAMINOPHEN 325MG 650 MG: 325 TABLET ORAL at 10:47

## 2024-01-09 NOTE — ED NOTES
Pt now started with chills, blankets provided. Breathing improved after neb tx.      Sherman Stalney V RN  01/09/24 [phone]

## 2024-01-09 NOTE — Clinical Note
Dahlia Iverson was seen and treated in our emergency department on 1/9/2024.                Diagnosis:     Dahlia  may return to work on return date.    She may return on this date: 01/12/2024         If you have any questions or concerns, please don't hesitate to call.      Sita Reina MD    ______________________________           _______________          _______________  Hospital Representative                              Date                                Time

## 2024-01-09 NOTE — DISCHARGE INSTRUCTIONS
Please continue using albuterol inhaler or nebulizer every 4 hours as needed for wheezing and shortness of breath.     You may take over the counter guaifenesin for cough and mucus as needed.

## 2024-01-09 NOTE — ED ATTENDING ATTESTATION
1/9/2024  I, Ivan Reynolds MD, saw and evaluated the patient. I have discussed the patient with the resident/non-physician practitioner and agree with the resident's/non-physician practitioner's findings, Plan of Care, and MDM as documented in the resident's/non-physician practitioner's note, except where noted. All available labs and Radiology studies were reviewed.  I was present for key portions of any procedure(s) performed by the resident/non-physician practitioner and I was immediately available to provide assistance.       At this point I agree with the current assessment done in the Emergency Department.  I have conducted an independent evaluation of this patient a history and physical is as follows:    History of asthma which is getting worse.  Complain of bodyaches and cough.  Chest pain with coughing.  She has mild wheezing and looks to be in no acute respiratory distress.  Is afebrile here with a normal oxygen saturation.  I personally reviewed the x-ray I do not see any effusion or infiltrate or pneumothorax and the mediastinum looks normal.  She has a positive for influenza.  Even though she may be outside the window given her asthma she feels is getting worse will place on steroids and Tamiflu.  I will illness does not require antibiotics.    ED Course         Critical Care Time  Procedures

## 2024-01-09 NOTE — ED PROVIDER NOTES
History  Chief Complaint   Patient presents with    Shortness of Breath     Pt reports SOB and dry cough for 3 days, pt reports back and chest hurt when she coughs      Patient is 38-year-old female medical history significant for asthma presenting with shortness of breath , dry cough, and wheezing for past 3-4 days.  Patient states she lost her albuterol inhaler but has been taking her albuterol nebulizer every 2-3 hours for wheezing, dry cough, shortness of breath. Home COVID test was negative.  Patient is also been having rhinorrhea and body aches.  Patient is a lifetime non-smoker and denies any sick contacts.  She took TheraFlu and Tylenol on Sunday and use ibuprofen yesterday for her shoulder and arm aches.  Patient last used her nebulizer at 7:45 AM before presentation to the ED.        Prior to Admission Medications   Prescriptions Last Dose Informant Patient Reported? Taking?   EPINEPHrine (EPIPEN) 0.3 mg/0.3 mL SOAJ   No No   Sig: Inject 0.3 mL (0.3 mg total) into a muscle once for 1 dose   albuterol (5 mg/mL) 0.5 % nebulizer solution 1/9/2024  No Yes   Sig: Take 0.5 mL (2.5 mg total) by nebulization every 6 (six) hours as needed for wheezing   albuterol (PROVENTIL HFA,VENTOLIN HFA) 90 mcg/act inhaler 1/9/2024  No Yes   Sig: Inhale 2 puffs every 4 (four) hours as needed for wheezing   famotidine (PEPCID) 20 mg tablet Not Taking  No No   Sig: Take 1 tablet (20 mg total) by mouth 2 (two) times a day   Patient not taking: Reported on 1/9/2024   loratadine (CLARITIN) 10 mg tablet Not Taking  No No   Sig: Take 1 tablet (10 mg total) by mouth daily   Patient not taking: Reported on 8/30/2022   naproxen (NAPROSYN) 500 mg tablet Not Taking  No No   Sig: Take 1 tablet (500 mg total) by mouth 2 (two) times a day with meals   Patient not taking: Reported on 1/9/2024   omeprazole (PriLOSEC) 20 mg delayed release capsule Not Taking  No No   Sig: Take 1 capsule (20 mg total) by mouth daily   Patient not taking: Reported  on 8/30/2022   ondansetron (ZOFRAN) 4 mg tablet Not Taking  No No   Sig: Take 1 tablet (4 mg total) by mouth every 8 (eight) hours as needed for nausea   Patient not taking: Reported on 8/30/2022   ondansetron (ZOFRAN-ODT) 4 mg disintegrating tablet Not Taking  No No   Sig: Take 1 tablet (4 mg total) by mouth every 6 (six) hours as needed for nausea   Patient not taking: Reported on 1/9/2024   sucralfate (CARAFATE) 1 g tablet Not Taking  No No   Sig: Take 1 tablet (1 g total) by mouth 4 (four) times a day   Patient not taking: Reported on 1/9/2024      Facility-Administered Medications: None       Past Medical History:   Diagnosis Date    Asthma     No known problems        History reviewed. No pertinent surgical history.    History reviewed. No pertinent family history.  I have reviewed and agree with the history as documented.    E-Cigarette/Vaping    E-Cigarette Use Never User      E-Cigarette/Vaping Substances    Nicotine No     THC No     CBD No     Flavoring No     Other No     Unknown No      Social History     Tobacco Use    Smoking status: Never    Smokeless tobacco: Never   Vaping Use    Vaping status: Never Used   Substance Use Topics    Alcohol use: Never    Drug use: Never        Review of Systems   Constitutional:  Negative for chills and fever.   HENT:  Positive for congestion and rhinorrhea. Negative for ear pain and sore throat.    Eyes:  Negative for pain and visual disturbance.   Respiratory:  Positive for cough, shortness of breath and wheezing.    Cardiovascular:  Negative for chest pain and palpitations.   Gastrointestinal:  Negative for abdominal pain and vomiting.   Genitourinary:  Negative for dysuria and hematuria.   Musculoskeletal:  Negative for arthralgias and back pain.   Skin:  Negative for color change and rash.   Neurological:  Negative for seizures and syncope.   All other systems reviewed and are negative.      Physical Exam  ED Triage Vitals   Temperature Pulse Respirations Blood  Pressure SpO2   01/09/24 0834 01/09/24 0834 01/09/24 0834 01/09/24 0834 01/09/24 0834   99.1 °F (37.3 °C) 94 18 116/70 97 %      Temp Source Heart Rate Source Patient Position - Orthostatic VS BP Location FiO2 (%)   01/09/24 0834 01/09/24 0834 01/09/24 0834 01/09/24 0834 --   Oral Monitor Sitting Right arm       Pain Score       01/09/24 1047       4             Orthostatic Vital Signs  Vitals:    01/09/24 0834   BP: 116/70   Pulse: 94   Patient Position - Orthostatic VS: Sitting       Physical Exam  Constitutional:       Appearance: She is well-developed.   HENT:      Mouth/Throat:      Mouth: Mucous membranes are moist.   Eyes:      Pupils: Pupils are equal, round, and reactive to light.   Cardiovascular:      Rate and Rhythm: Normal rate.   Pulmonary:      Effort: Pulmonary effort is normal. No tachypnea or accessory muscle usage.      Breath sounds: Normal breath sounds. No decreased breath sounds, wheezing, rhonchi or rales.   Neurological:      Mental Status: She is alert.         ED Medications  Medications   acetaminophen (TYLENOL) tablet 650 mg (650 mg Oral Given 1/9/24 1047)       Diagnostic Studies  Results Reviewed       Procedure Component Value Units Date/Time    FLU/RSV/COVID - if FLU/RSV clinically relevant [[id number]]  (Abnormal) Collected: 01/09/24 0931    Lab Status: Final result Specimen: Nares from Nose Updated: 01/09/24 1032     SARS-CoV-2 Negative     INFLUENZA A PCR Positive     INFLUENZA B PCR Negative     RSV PCR Negative    Narrative:      FOR PEDIATRIC PATIENTS - copy/paste COVID Guidelines URL to browser: [email or web address]    SARS-CoV-2 assay is a Nucleic Acid Amplification assay intended for the  qualitative detection of nucleic acid from SARS-CoV-2 in nasopharyngeal  swabs. Results are for the presumptive identification of SARS-CoV-2 RNA.    Positive results are indicative of infection with SARS-CoV-2, the virus  causing  COVID-19, but do not rule out bacterial infection or co-infection  with other viruses. Laboratories within the [place] and its  territories are required to report all positive results to the appropriate  public health authorities. Negative results do not preclude SARS-CoV-2  infection and should not be used as the sole basis for treatment or other  patient management decisions. Negative results must be combined with  clinical observations, patient history, and epidemiological information.  This test has not been FDA cleared or approved.    This test has been authorized by FDA under an Emergency Use Authorization  (EUA). This test is only authorized for the duration of time the  declaration that circumstances exist justifying the authorization of the  emergency use of an in vitro diagnostic tests for detection of SARS-CoV-2  virus and/or diagnosis of COVID-19 infection under section 564(b)(1) of  the Act, 21 U.S.C. 360bbb-3(b)(1), unless the authorization is terminated  or revoked sooner. The test has been validated but independent review by FDA  and CLIA is pending.    Test performed using [employer]: This RT-PCR assay targets N2,  a region unique to SARS-CoV-2. A conserved region in the E-gene was chosen  for pan-Sarbecovirus detection which includes SARS-CoV-2.    According to CMS-2020-01-R, this platform meets the definition of high-throughput technology.                   XR chest 2 views   ED Interpretation by Ivan Reynolds MD (01/09 1008)   I have reviewed the film, per my independent interpretation : No acute infiltrate or effusion.  No pneumothorax.  Mediastinum appears normal..        Final Result by Mauro Gray MD (01/09 1402)      No acute cardiopulmonary disease.                  Workstation performed: [id number]               Procedures  Procedures      ED Course  ED Course as of 01/10/24 1741   Tue Jan 09, 2024   0920 Blood Pressure: 116/70  Vitals stable, saturating  well on room air   1016 XR chest 2 views  Cxr appears normal without consolidation concerning for pneumonia per my read   1036 Patient is influenza a positive   1045 Discussed with patient diagnosis of influenza A.  Discussed supportive care at home and isolation from children.                             SBIRT 20yo+      Flowsheet Row Most Recent Value   Initial Alcohol Screen: US AUDIT-C     1. How often do you have a drink containing alcohol? 0 Filed at: 01/09/2024 0903   2. How many drinks containing alcohol do you have on a typical day you are drinking?  0 Filed at: 01/09/2024 0903   3a. Male UNDER 65: How often do you have five or more drinks on one occasion? 0 Filed at: 01/09/2024 0903   3b. FEMALE Any Age, or MALE 65+: How often do you have 4 or more drinks on one occassion? 0 Filed at: 01/09/2024 0903   Audit-C Score 0 Filed at: 01/09/2024 0903   PRITI: How many times in the past year have you...    Used an illegal drug or used a prescription medication for non-medical reasons? Never Filed at: 01/09/2024 0903                  Medical Decision Making  Patient is 38-year-old female with past medical history significant for asthma presenting with shortness of breath, dry cough and wheezing for the past 3 to 4 days.  Home COVID test was negative.  Patient has regular rate and rhythm with no wheezes rales or rhonchi on exam.  Chest x-ray negative for any acute pneumonia per my read.  COVID flu and RSV swab returned positive for influenza A.  Patient is outside of the window for Tamiflu however, with concomitant asthma could consider initiating.  Through shared decision making with patient, opted to begin treatment with Tamiflu to prevent worsening of symptoms.  Will also initiate 40 mg of prednisone daily for 5-day course, refill of albuterol inhaler with spacer also sent to pharmacy.  Discussed supportive care with patient and advised her to continue using albuterol every 4 hours as needed for acute shortness of  breath.  Patient agreeable to discharge home with outpatient follow-up with PCP as needed.  Return precautions discussed.    Amount and/or Complexity of Data Reviewed  Radiology: ordered and independent interpretation performed. Decision-making details documented in ED Course.    Risk  OTC drugs.  Prescription drug management.          Disposition  Final diagnoses:   Viral URI with cough   Influenza A   Asthma, unspecified asthma severity, unspecified whether complicated, unspecified whether persistent     Time reflects when diagnosis was documented in both MDM as applicable and the Disposition within this note       Time User Action Codes Description Comment    1/9/2024 10:29 AM Sita Reina Add [J06.9] Viral URI with cough     1/9/2024 10:37 AM Sita Reina Add [J10.1] Influenza A     1/9/2024 10:37 AM Sita Reina Modify [J06.9] Viral URI with cough     1/9/2024 10:37 AM Sita Reina Modify [J10.1] Influenza A     1/9/2024 10:51 AM Sita Reina Add [J45.909] Asthma, unspecified asthma severity, unspecified whether complicated, unspecified whether persistent           ED Disposition       ED Disposition   Discharge    Condition   Stable    Date/Time   Tue Jan 9, 2024 1028    Comment   Dahlia Iverson discharge to home/self care.                   Follow-up Information       Follow up With Specialties Details Why Contact Info    [facility] Call in 1 week As needed [address] [facility] [address] [phone]              Discharge Medication List as of 1/9/2024 10:55 AM        START taking these medications    Details   !! albuterol (Proventil HFA) 90 mcg/act inhaler Inhale 1-2 puffs every 6 (six) hours as needed for wheezing, Starting Tue 1/9/2024, Normal      oseltamivir (TAMIFLU) 75 mg capsule Take 1 capsule (75 mg total) by mouth 2 (two) times a day for 5 days, Starting Tue 1/9/2024, Until Sun 1/14/2024, Normal      predniSONE 20 mg tablet Take 2  tablets (40 mg total) by mouth daily for 5 days, Starting Tue 1/9/2024, Until Sun 1/14/2024, Normal       !! - Potential duplicate medications found. Please discuss with provider.        CONTINUE these medications which have NOT CHANGED    Details   albuterol (5 mg/mL) 0.5 % nebulizer solution Take 0.5 mL (2.5 mg total) by nebulization every 6 (six) hours as needed for wheezing, Starting Tue 11/30/2021, Normal      !! albuterol (PROVENTIL HFA,VENTOLIN HFA) 90 mcg/act inhaler Inhale 2 puffs every 4 (four) hours as needed for wheezing, Starting Tue 11/30/2021, Normal      EPINEPHrine (EPIPEN) 0.3 mg/0.3 mL SOAJ Inject 0.3 mL (0.3 mg total) into a muscle once for 1 dose, Starting Wed 11/9/2022, Normal      famotidine (PEPCID) 20 mg tablet Take 1 tablet (20 mg total) by mouth 2 (two) times a day, Starting Sun 8/27/2023, Normal      loratadine (CLARITIN) 10 mg tablet Take 1 tablet (10 mg total) by mouth daily, Starting Tue 10/26/2021, Normal      naproxen (NAPROSYN) 500 mg tablet Take 1 tablet (500 mg total) by mouth 2 (two) times a day with meals, Starting Tue 2/14/2023, Normal      omeprazole (PriLOSEC) 20 mg delayed release capsule Take 1 capsule (20 mg total) by mouth daily, Starting Wed 8/10/2022, Normal      ondansetron (ZOFRAN) 4 mg tablet Take 1 tablet (4 mg total) by mouth every 8 (eight) hours as needed for nausea, Starting Wed 8/10/2022, Normal      ondansetron (ZOFRAN-ODT) 4 mg disintegrating tablet Take 1 tablet (4 mg total) by mouth every 6 (six) hours as needed for nausea, Starting Sun 8/27/2023, Normal      sucralfate (CARAFATE) 1 g tablet Take 1 tablet (1 g total) by mouth 4 (four) times a day, Starting Sun 8/27/2023, Normal       !! - Potential duplicate medications found. Please discuss with provider.        Outpatient Discharge Orders   Spacer Device for Inhaler       PDMP Review       None             ED Provider  Attending physically available and evaluated Dahlia Iverson. I managed the patient  along with the ED Attending.    Electronically Signed by           Sita Reina MD  01/10/24 4478

## 2024-02-21 PROBLEM — J06.9 VIRAL URI WITH COUGH: Status: RESOLVED | Noted: 2024-01-09 | Resolved: 2024-02-21

## 2024-05-20 ENCOUNTER — HOSPITAL ENCOUNTER (EMERGENCY)
Facility: HOSPITAL | Age: 39
Discharge: HOME | End: 2024-05-20
Attending: EMERGENCY MEDICINE
Payer: COMMERCIAL

## 2024-05-20 ENCOUNTER — APPOINTMENT (EMERGENCY)
Dept: CT IMAGING | Facility: HOSPITAL | Age: 39
End: 2024-05-20
Payer: COMMERCIAL

## 2024-05-20 VITALS
OXYGEN SATURATION: 98 % | HEART RATE: 68 BPM | SYSTOLIC BLOOD PRESSURE: 101 MMHG | DIASTOLIC BLOOD PRESSURE: 64 MMHG | TEMPERATURE: 98.4 F | RESPIRATION RATE: 16 BRPM

## 2024-05-20 DIAGNOSIS — M54.9 BACK PAIN: Primary | ICD-10-CM

## 2024-05-20 LAB
ANION GAP SERPL CALCULATED.3IONS-SCNC: 5 MMOL/L (ref 4–13)
BASOPHILS # BLD AUTO: 0.01 THOUSANDS/ÂΜL (ref 0–0.1)
BASOPHILS NFR BLD AUTO: 0 % (ref 0–1)
BILIRUB UR QL STRIP: NEGATIVE
BUN SERPL-MCNC: 15 MG/DL (ref 5–25)
CALCIUM SERPL-MCNC: 9.6 MG/DL (ref 8.4–10.2)
CHLORIDE SERPL-SCNC: 105 MMOL/L (ref 96–108)
CLARITY UR: CLEAR
CO2 SERPL-SCNC: 27 MMOL/L (ref 21–32)
COLOR UR: YELLOW
CREAT SERPL-MCNC: 0.71 MG/DL (ref 0.6–1.3)
EOSINOPHIL # BLD AUTO: 0.08 THOUSAND/ÂΜL (ref 0–0.61)
EOSINOPHIL NFR BLD AUTO: 2 % (ref 0–6)
ERYTHROCYTE [DISTWIDTH] IN BLOOD BY AUTOMATED COUNT: 13.1 % (ref 11.6–15.1)
EXT PREGNANCY TEST URINE: NEGATIVE
EXT. CONTROL: NORMAL
GFR SERPL CREATININE-BSD FRML MDRD: 109 ML/MIN/1.73SQ M
GLUCOSE SERPL-MCNC: 97 MG/DL (ref 65–140)
GLUCOSE UR STRIP-MCNC: NEGATIVE MG/DL
HCT VFR BLD AUTO: 43.7 % (ref 34.8–46.1)
HGB BLD-MCNC: 14.5 G/DL (ref 11.5–15.4)
HGB UR QL STRIP.AUTO: NEGATIVE
IMM GRANULOCYTES # BLD AUTO: 0.02 THOUSAND/UL (ref 0–0.2)
IMM GRANULOCYTES NFR BLD AUTO: 0 % (ref 0–2)
KETONES UR STRIP-MCNC: NEGATIVE MG/DL
LEUKOCYTE ESTERASE UR QL STRIP: NEGATIVE
LYMPHOCYTES # BLD AUTO: 1.08 THOUSANDS/ÂΜL (ref 0.6–4.47)
LYMPHOCYTES NFR BLD AUTO: 22 % (ref 14–44)
MCH RBC QN AUTO: 29.8 PG (ref 26.8–34.3)
MCHC RBC AUTO-ENTMCNC: 33.2 G/DL (ref 31.4–37.4)
MCV RBC AUTO: 90 FL (ref 82–98)
MONOCYTES # BLD AUTO: 0.36 THOUSAND/ÂΜL (ref 0.17–1.22)
MONOCYTES NFR BLD AUTO: 7 % (ref 4–12)
NEUTROPHILS # BLD AUTO: 3.3 THOUSANDS/ÂΜL (ref 1.85–7.62)
NEUTS SEG NFR BLD AUTO: 69 % (ref 43–75)
NITRITE UR QL STRIP: NEGATIVE
NRBC BLD AUTO-RTO: 0 /100 WBCS
PH UR STRIP.AUTO: 5.5 [PH] (ref 4.5–8)
PLATELET # BLD AUTO: 286 THOUSANDS/UL (ref 149–390)
PMV BLD AUTO: 10.7 FL (ref 8.9–12.7)
POTASSIUM SERPL-SCNC: 4.4 MMOL/L (ref 3.5–5.3)
PROT UR STRIP-MCNC: NEGATIVE MG/DL
RBC # BLD AUTO: 4.86 MILLION/UL (ref 3.81–5.12)
SODIUM SERPL-SCNC: 137 MMOL/L (ref 135–147)
SP GR UR STRIP.AUTO: >=1.03 (ref 1–1.03)
UROBILINOGEN UR QL STRIP.AUTO: 0.2 E.U./DL
WBC # BLD AUTO: 4.85 THOUSAND/UL (ref 4.31–10.16)

## 2024-05-20 PROCEDURE — 81003 URINALYSIS AUTO W/O SCOPE: CPT

## 2024-05-20 PROCEDURE — 85025 COMPLETE CBC W/AUTO DIFF WBC: CPT

## 2024-05-20 PROCEDURE — 36415 COLL VENOUS BLD VENIPUNCTURE: CPT

## 2024-05-20 PROCEDURE — 81025 URINE PREGNANCY TEST: CPT

## 2024-05-20 PROCEDURE — 99285 EMERGENCY DEPT VISIT HI MDM: CPT

## 2024-05-20 PROCEDURE — 96374 THER/PROPH/DIAG INJ IV PUSH: CPT

## 2024-05-20 PROCEDURE — 99284 EMERGENCY DEPT VISIT MOD MDM: CPT

## 2024-05-20 PROCEDURE — 80048 BASIC METABOLIC PNL TOTAL CA: CPT

## 2024-05-20 PROCEDURE — 74177 CT ABD & PELVIS W/CONTRAST: CPT

## 2024-05-20 RX ORDER — KETOROLAC TROMETHAMINE 30 MG/ML
15 INJECTION, SOLUTION INTRAMUSCULAR; INTRAVENOUS ONCE
Status: COMPLETED | OUTPATIENT
Start: 2024-05-20 | End: 2024-05-20

## 2024-05-20 RX ORDER — NAPROXEN 500 MG/1
500 TABLET ORAL 2 TIMES DAILY WITH MEALS
Qty: 30 TABLET | Refills: 0 | Status: SHIPPED | OUTPATIENT
Start: 2024-05-20

## 2024-05-20 RX ORDER — METHOCARBAMOL 500 MG/1
500 TABLET, FILM COATED ORAL 2 TIMES DAILY
Qty: 20 TABLET | Refills: 0 | Status: SHIPPED | OUTPATIENT
Start: 2024-05-20

## 2024-05-20 RX ADMIN — IOHEXOL 100 ML: 350 INJECTION, SOLUTION INTRAVENOUS at 10:42

## 2024-05-20 RX ADMIN — KETOROLAC TROMETHAMINE 15 MG: 30 INJECTION, SOLUTION INTRAMUSCULAR at 08:47

## 2024-05-20 NOTE — ED PROVIDER NOTES
History  Chief Complaint   Patient presents with    Back Pain     Back pain since Friday. The pain started as lower pain, and then has migrated up her back. Denies urinary symptoms. Has tried Ibuprofen and Tylenol but they are not helping.     Patient is a 39-year-old female with a history of asthma presenting for evaluation of bilateral back pain.  States it started as a pain that is migrated up to her mid back.  She denies associated urinary symptoms.  Pain does not radiate into the abdomen or legs.  No injury to the back.  No fevers, history of IVDU, saddle anesthesia, bowel or bladder dysfunction.  No nausea, vomiting, diarrhea, constipation.  Taking Motrin and Tylenol at home without relief.      Back Pain  Associated symptoms: no abdominal pain, no chest pain, no dysuria and no fever        Prior to Admission Medications   Prescriptions Last Dose Informant Patient Reported? Taking?   EPINEPHrine (EPIPEN) 0.3 mg/0.3 mL SOAJ   No No   Sig: Inject 0.3 mL (0.3 mg total) into a muscle once for 1 dose   albuterol (5 mg/mL) 0.5 % nebulizer solution   No No   Sig: Take 0.5 mL (2.5 mg total) by nebulization every 6 (six) hours as needed for wheezing   albuterol (PROVENTIL HFA,VENTOLIN HFA) 90 mcg/act inhaler   No No   Sig: Inhale 2 puffs every 4 (four) hours as needed for wheezing   albuterol (Proventil HFA) 90 mcg/act inhaler   No No   Sig: Inhale 1-2 puffs every 6 (six) hours as needed for wheezing   famotidine (PEPCID) 20 mg tablet   No No   Sig: Take 1 tablet (20 mg total) by mouth 2 (two) times a day   Patient not taking: Reported on 1/9/2024   loratadine (CLARITIN) 10 mg tablet   No No   Sig: Take 1 tablet (10 mg total) by mouth daily   Patient not taking: Reported on 8/30/2022   naproxen (NAPROSYN) 500 mg tablet   No No   Sig: Take 1 tablet (500 mg total) by mouth 2 (two) times a day with meals   Patient not taking: Reported on 1/9/2024   omeprazole (PriLOSEC) 20 mg delayed release capsule   No No   Sig: Take 1  capsule (20 mg total) by mouth daily   Patient not taking: Reported on 8/30/2022   ondansetron (ZOFRAN) 4 mg tablet   No No   Sig: Take 1 tablet (4 mg total) by mouth every 8 (eight) hours as needed for nausea   Patient not taking: Reported on 8/30/2022   ondansetron (ZOFRAN-ODT) 4 mg disintegrating tablet   No No   Sig: Take 1 tablet (4 mg total) by mouth every 6 (six) hours as needed for nausea   Patient not taking: Reported on 1/9/2024   sucralfate (CARAFATE) 1 g tablet   No No   Sig: Take 1 tablet (1 g total) by mouth 4 (four) times a day   Patient not taking: Reported on 1/9/2024      Facility-Administered Medications: None       Past Medical History:   Diagnosis Date    Asthma     No known problems        History reviewed. No pertinent surgical history.    History reviewed. No pertinent family history.  I have reviewed and agree with the history as documented.    E-Cigarette/Vaping    E-Cigarette Use Never User      E-Cigarette/Vaping Substances    Nicotine No     THC No     CBD No     Flavoring No     Other No     Unknown No      Social History     Tobacco Use    Smoking status: Never    Smokeless tobacco: Never   Vaping Use    Vaping status: Never Used   Substance Use Topics    Alcohol use: Never    Drug use: Never       Review of Systems   Constitutional:  Negative for chills and fever.   HENT:  Negative for congestion, ear pain and sore throat.    Eyes:  Negative for pain and visual disturbance.   Respiratory:  Negative for cough and shortness of breath.    Cardiovascular:  Negative for chest pain and palpitations.   Gastrointestinal:  Negative for abdominal pain and vomiting.   Genitourinary:  Positive for flank pain. Negative for dysuria, frequency, hematuria, urgency and vaginal bleeding.   Musculoskeletal:  Positive for back pain. Negative for arthralgias.   Skin:  Negative for color change and rash.   Neurological:  Negative for seizures and syncope.   All other systems reviewed and are  negative.      Physical Exam  Physical Exam  Vitals and nursing note reviewed.   Constitutional:       General: She is not in acute distress.     Appearance: Normal appearance. She is not toxic-appearing.   HENT:      Head: Normocephalic and atraumatic.      Right Ear: External ear normal.      Left Ear: External ear normal.      Nose: Nose normal.      Mouth/Throat:      Mouth: Mucous membranes are moist.   Eyes:      General: No scleral icterus.        Right eye: No discharge.         Left eye: No discharge.      Extraocular Movements: Extraocular movements intact.      Conjunctiva/sclera: Conjunctivae normal.   Cardiovascular:      Rate and Rhythm: Normal rate and regular rhythm.      Pulses: Normal pulses.      Heart sounds: Normal heart sounds.   Pulmonary:      Effort: Pulmonary effort is normal. No respiratory distress.      Breath sounds: Normal breath sounds.   Abdominal:      Palpations: Abdomen is soft.      Tenderness: There is no abdominal tenderness. There is right CVA tenderness and left CVA tenderness.   Musculoskeletal:         General: No tenderness, deformity or signs of injury.      Cervical back: Normal, normal range of motion and neck supple.      Thoracic back: Normal.      Comments: Tenderness to bilateral lumbar paraspinal muscles. No midline spine tenderness, step offs, deformities. Strength, sensation equal and intact in bilateral lower extremities. DP and PT pulses 2+ bilaterally.     Skin:     General: Skin is dry.      Coloration: Skin is not jaundiced.      Findings: No erythema or rash.   Neurological:      General: No focal deficit present.      Mental Status: She is alert and oriented to person, place, and time. Mental status is at baseline.      Motor: No weakness.      Gait: Gait normal.   Psychiatric:         Mood and Affect: Mood normal.         Behavior: Behavior normal.         Thought Content: Thought content normal.         Vital Signs  ED Triage Vitals [05/20/24 0750]    Temperature Pulse Respirations Blood Pressure SpO2   98.4 °F (36.9 °C) 85 16 100/69 96 %      Temp Source Heart Rate Source Patient Position - Orthostatic VS BP Location FiO2 (%)   Oral Monitor Sitting Right arm --      Pain Score       3           Vitals:    05/20/24 0750 05/20/24 1027   BP: 100/69 101/64   Pulse: 85 68   Patient Position - Orthostatic VS: Sitting Sitting         Visual Acuity      ED Medications  Medications   ketorolac (TORADOL) injection 15 mg (15 mg Intravenous Given 5/20/24 0847)   iohexol (OMNIPAQUE) 350 MG/ML injection (SINGLE-DOSE) 100 mL (100 mL Intravenous Given 5/20/24 1042)       Diagnostic Studies  Results Reviewed       Procedure Component Value Units Date/Time    POCT pregnancy, urine [[id number]]  (Normal) Resulted: 05/20/24 0845    Lab Status: Final result Updated: 05/20/24 1026     EXT Preg Test, Ur Negative     Control Valid    Basic metabolic panel [[id number]] Collected: 05/20/24 0847    Lab Status: Final result Specimen: Blood from Arm, Left Updated: 05/20/24 0917     Sodium 137 mmol/L      Potassium 4.4 mmol/L      Chloride 105 mmol/L      CO2 27 mmol/L      ANION GAP 5 mmol/L      BUN 15 mg/dL      Creatinine 0.71 mg/dL      Glucose 97 mg/dL      Calcium 9.6 mg/dL      eGFR 109 ml/min/1.73sq m     Narrative:      [employer] Kid[employer] guidelines for Chronic Kidney Disease (CKD):     Stage 1 with normal or high GFR (GFR > 90 mL/min/1.73 square meters)    Stage 2 Mild CKD (GFR = 60-89 mL/min/1.73 square meters)    Stage 3A Moderate CKD (GFR = 45-59 mL/min/1.73 square meters)    Stage 3B Moderate CKD (GFR = 30-44 mL/min/1.73 square meters)    Stage 4 Severe CKD (GFR = 15-29 mL/min/1.73 square meters)    Stage 5 End Stage CKD (GFR <15 mL/min/1.73 square meters)  Note: GFR calculation is accurate only with a steady state creatinine    CBC and differential [[id number]] Collected: 05/20/24 0847    Lab Status: Final result Specimen: Blood from Arm, Left Updated: 05/20/24  0854     WBC 4.85 Thousand/uL      RBC 4.86 Million/uL      Hemoglobin 14.5 g/dL      Hematocrit 43.7 %      MCV 90 fL      MCH 29.8 pg      MCHC 33.2 g/dL      RDW 13.1 %      MPV 10.7 fL      Platelets 286 Thousands/uL      nRBC 0 /100 WBCs      Segmented % 69 %      Immature Grans % 0 %      Lymphocytes % 22 %      Monocytes % 7 %      Eosinophils Relative 2 %      Basophils Relative 0 %      Absolute Neutrophils 3.30 Thousands/µL      Absolute Immature Grans 0.02 Thousand/uL      Absolute Lymphocytes 1.08 Thousands/µL      Absolute Monocytes 0.36 Thousand/µL      Eosinophils Absolute 0.08 Thousand/µL      Basophils Absolute 0.01 Thousands/µL     Urine Macroscopic, POC [[id number]] Collected: 05/20/24 0836    Lab Status: Final result Specimen: Urine Updated: 05/20/24 0837     Color, UA Yellow     Clarity, UA Clear     pH, UA 5.5     Leukocytes, UA Negative     Nitrite, UA Negative     Protein, UA Negative mg/dl      Glucose, UA Negative mg/dl      Ketones, UA Negative mg/dl      Urobilinogen, UA 0.2 E.U./dl      Bilirubin, UA Negative     Occult Blood, UA Negative     Specific Gravity, UA >=1.030    Narrative:      CLINITEK RESULT                   CT abdomen pelvis w contrast   Final Result by Kirk Cook MD (05/20 1123)      No acute abnormality in the abdomen or pelvis.         Workstation performed: [id number]                    Procedures  Procedures         ED Course  ED Course as of 05/20/24 1617   Mon May 20, 2024   0857 Urine Macroscopic, POC  No evidence of infection.   0857 WBC: 4.85   0917 Creatinine: 0.71  No LIDIA.   1100 Updated patient on labs/UA. Pain improving. CT pending.   1138 CT abdomen pelvis w contrast  IMPRESSION:     No acute abnormality in the abdomen or pelvis.                                 SBIRT 20yo+      Flowsheet Row Most Recent Value   Initial Alcohol Screen: US AUDIT-C     1. How often do you have a drink containing alcohol? 0 Filed at: 05/20/2024 1156   2. How many drinks  containing alcohol do you have on a typical day you are drinking?  0 Filed at: 05/20/2024 1156   3a. Male UNDER 65: How often do you have five or more drinks on one occasion? 0 Filed at: 05/20/2024 1156   3b. FEMALE Any Age, or MALE 65+: How often do you have 4 or more drinks on one occassion? 0 Filed at: 05/20/2024 1156   Audit-C Score 0 Filed at: 05/20/2024 1156   PRITI: How many times in the past year have you...    Used an illegal drug or used a prescription medication for non-medical reasons? Never Filed at: 05/20/2024 1156                      Medical Decision Making  Patient is a 39-year-old female presenting with bilateral flank/low back pain.  All vitals are stable on arrival and she is in no acute distress.  DDx include mild to: Benefis, nephrolithiasis, radiculopathy, muscle spasm.  Will obtain CBC to evaluate for leukocytosis and anemia, CMP for LIDIA, UA infection CT kidney stones or other intra-abdominal pathology.  Will treat symptomatically with Toradol.    Labs are unremarkable.  Urine negative for evidence of infection.  Urine pregnancy is.  CT negative for acute abnormality.  Suspect musculoskeletal pain.  Will discharge with naproxen, Robaxin, Lidoderm.  Placed ambulatory referral for comprehensive spine program for follow-up.    I have discussed findings and plan for discharge with the patient/caregiver. Follow up with the appropriate providers including primary care physician was discussed. Return precautions discussed with patient/caregiver as outlined in AVS. Patient/caregiver verbally expressed understanding. Patient stable at time of discharge and ambulated out of the emergency department.         Amount and/or Complexity of Data Reviewed  Labs: ordered. Decision-making details documented in ED Course.  Radiology: ordered. Decision-making details documented in ED Course.    Risk  Prescription drug management.             Disposition  Final diagnoses:   Back pain     Time reflects when  diagnosis was documented in both MDM as applicable and the Disposition within this note       Time User Action Codes Description Comment    5/20/2024 11:44 AM Kadenannabel Yi Add [M54.9] Back pain           ED Disposition       ED Disposition   Discharge    Condition   Stable    Date/Time   Mon May 20, 2024 1144    Comment   Dahlia Iverson discharge to home/self care.                   Follow-up Information       Follow up With Specialties Details Why Contact Info    [facility]   [address] [address]  [phone]              Discharge Medication List as of 5/20/2024 11:46 AM        START taking these medications    Details   methocarbamol (ROBAXIN) 500 mg tablet Take 1 tablet (500 mg total) by mouth 2 (two) times a day, Starting Mon 5/20/2024, Normal      !! naproxen (Naprosyn) 500 mg tablet Take 1 tablet (500 mg total) by mouth 2 (two) times a day with meals, Starting Mon 5/20/2024, Normal       !! - Potential duplicate medications found. Please discuss with provider.        CONTINUE these medications which have NOT CHANGED    Details   albuterol (5 mg/mL) 0.5 % nebulizer solution Take 0.5 mL (2.5 mg total) by nebulization every 6 (six) hours as needed for wheezing, Starting Tue 11/30/2021, Normal      !! albuterol (Proventil HFA) 90 mcg/act inhaler Inhale 1-2 puffs every 6 (six) hours as needed for wheezing, Starting Tue 1/9/2024, Normal      !! albuterol (PROVENTIL HFA,VENTOLIN HFA) 90 mcg/act inhaler Inhale 2 puffs every 4 (four) hours as needed for wheezing, Starting Tue 11/30/2021, Normal      EPINEPHrine (EPIPEN) 0.3 mg/0.3 mL SOAJ Inject 0.3 mL (0.3 mg total) into a muscle once for 1 dose, Starting Wed 11/9/2022, Normal      famotidine (PEPCID) 20 mg tablet Take 1 tablet (20 mg total) by mouth 2 (two) times a day, Starting Sun 8/27/2023, Normal      loratadine (CLARITIN) 10 mg tablet Take 1 tablet (10 mg total) by mouth daily, Starting Tue 10/26/2021, Normal       !! naproxen (NAPROSYN) 500 mg tablet Take 1 tablet (500 mg total) by mouth 2 (two) times a day with meals, Starting Tue 2/14/2023, Normal      omeprazole (PriLOSEC) 20 mg delayed release capsule Take 1 capsule (20 mg total) by mouth daily, Starting Wed 8/10/2022, Normal      ondansetron (ZOFRAN) 4 mg tablet Take 1 tablet (4 mg total) by mouth every 8 (eight) hours as needed for nausea, Starting Wed 8/10/2022, Normal      ondansetron (ZOFRAN-ODT) 4 mg disintegrating tablet Take 1 tablet (4 mg total) by mouth every 6 (six) hours as needed for nausea, Starting Sun 8/27/2023, Normal      sucralfate (CARAFATE) 1 g tablet Take 1 tablet (1 g total) by mouth 4 (four) times a day, Starting Sun 8/27/2023, Normal       !! - Potential duplicate medications found. Please discuss with provider.              PDMP Review       None            ED Provider  Electronically Signed by             Yi Velez PA-C  05/20/24 8446

## 2024-05-20 NOTE — Clinical Note
Dahlia Iverson was seen and treated in our emergency department on 5/20/2024.                Diagnosis:     Dahlia  may return to work on return date.    She may return on this date: 05/21/2024         If you have any questions or concerns, please don't hesitate to call.      Yi Velez PA-C    ______________________________           _______________          _______________  Hospital Representative                              Date                                Time

## 2024-05-21 ENCOUNTER — TELEPHONE (OUTPATIENT)
Dept: PHYSICAL THERAPY | Facility: OTHER | Age: 39
End: 2024-05-21

## 2024-05-21 NOTE — TELEPHONE ENCOUNTER
Call placed to the patient per [facility] referral.    Unable to dial out using preferred phone number in chart. Phone never rang. Tested my phone on another Pt, and had no issues.     Possible incorrect phone number?    This is the 1st attempt to reach the patient.  Will defer per protocol.

## 2024-07-07 ENCOUNTER — HOSPITAL ENCOUNTER (EMERGENCY)
Facility: HOSPITAL | Age: 39
Discharge: HOME | End: 2024-07-07
Attending: EMERGENCY MEDICINE
Payer: COMMERCIAL

## 2024-07-07 VITALS
TEMPERATURE: 98 F | OXYGEN SATURATION: 98 % | RESPIRATION RATE: 16 BRPM | WEIGHT: 185.19 LBS | BODY MASS INDEX: 29 KG/M2 | SYSTOLIC BLOOD PRESSURE: 118 MMHG | HEART RATE: 69 BPM | DIASTOLIC BLOOD PRESSURE: 77 MMHG

## 2024-07-07 DIAGNOSIS — N76.6 GENITAL LABIAL ULCER: Primary | ICD-10-CM

## 2024-07-07 DIAGNOSIS — N76.0 BACTERIAL VAGINOSIS: ICD-10-CM

## 2024-07-07 DIAGNOSIS — B96.89 BACTERIAL VAGINOSIS: ICD-10-CM

## 2024-07-07 LAB
BACTERIA UR QL AUTO: ABNORMAL /HPF
BILIRUB UR QL STRIP: NEGATIVE
CLARITY UR: CLEAR
COLOR UR: YELLOW
EXT PREGNANCY TEST URINE: NEGATIVE
EXT. CONTROL: NORMAL
GLUCOSE UR STRIP-MCNC: NEGATIVE MG/DL
HGB UR QL STRIP.AUTO: ABNORMAL
KETONES UR STRIP-MCNC: NEGATIVE MG/DL
LEUKOCYTE ESTERASE UR QL STRIP: NEGATIVE
MUCOUS THREADS UR QL AUTO: ABNORMAL
NITRITE UR QL STRIP: NEGATIVE
NON-SQ EPI CELLS URNS QL MICRO: ABNORMAL /HPF
PH UR STRIP.AUTO: 6 [PH] (ref 4.5–8)
PROT UR STRIP-MCNC: NEGATIVE MG/DL
RBC #/AREA URNS AUTO: ABNORMAL /HPF
SP GR UR STRIP.AUTO: >=1.03 (ref 1–1.03)
UROBILINOGEN UR QL STRIP.AUTO: 0.2 E.U./DL
WBC #/AREA URNS AUTO: ABNORMAL /HPF

## 2024-07-07 PROCEDURE — 87591 N.GONORRHOEAE DNA AMP PROB: CPT | Performed by: PHYSICIAN ASSISTANT

## 2024-07-07 PROCEDURE — 87491 CHLMYD TRACH DNA AMP PROBE: CPT | Performed by: PHYSICIAN ASSISTANT

## 2024-07-07 PROCEDURE — 81001 URINALYSIS AUTO W/SCOPE: CPT

## 2024-07-07 PROCEDURE — 99284 EMERGENCY DEPT VISIT MOD MDM: CPT | Performed by: PHYSICIAN ASSISTANT

## 2024-07-07 PROCEDURE — 81514 NFCT DS BV&VAGINITIS DNA ALG: CPT | Performed by: PHYSICIAN ASSISTANT

## 2024-07-07 PROCEDURE — 86780 TREPONEMA PALLIDUM: CPT | Performed by: PHYSICIAN ASSISTANT

## 2024-07-07 PROCEDURE — 36415 COLL VENOUS BLD VENIPUNCTURE: CPT | Performed by: PHYSICIAN ASSISTANT

## 2024-07-07 PROCEDURE — 81025 URINE PREGNANCY TEST: CPT | Performed by: PHYSICIAN ASSISTANT

## 2024-07-07 PROCEDURE — 87529 HSV DNA AMP PROBE: CPT | Performed by: PHYSICIAN ASSISTANT

## 2024-07-07 PROCEDURE — 99283 EMERGENCY DEPT VISIT LOW MDM: CPT

## 2024-07-07 RX ORDER — ACYCLOVIR 400 MG/1
400 TABLET ORAL 3 TIMES DAILY
Qty: 21 TABLET | Refills: 0 | Status: SHIPPED | OUTPATIENT
Start: 2024-07-07 | End: 2024-07-14

## 2024-07-07 NOTE — ED PROVIDER NOTES
History  Chief Complaint   Patient presents with    Exposure to STD     Pt wants to get checked for STD. States noted sore on vulva. Unknown exposure to STD's.      Patient presents emergency department concern for STD.  She reports an lesion to her groin that is been bothering her for about a week and a half she is concerned and came in.  Does not use condoms no new sexual partners no dysuria or other vaginal discharge. Does use gutierres x 3 weeks  No pain to area.   On menstrual cycle now          Prior to Admission Medications   Prescriptions Last Dose Informant Patient Reported? Taking?   EPINEPHrine (EPIPEN) 0.3 mg/0.3 mL SOAJ   No No   Sig: Inject 0.3 mL (0.3 mg total) into a muscle once for 1 dose   albuterol (5 mg/mL) 0.5 % nebulizer solution   No No   Sig: Take 0.5 mL (2.5 mg total) by nebulization every 6 (six) hours as needed for wheezing   albuterol (PROVENTIL HFA,VENTOLIN HFA) 90 mcg/act inhaler   No No   Sig: Inhale 2 puffs every 4 (four) hours as needed for wheezing   albuterol (Proventil HFA) 90 mcg/act inhaler   No No   Sig: Inhale 1-2 puffs every 6 (six) hours as needed for wheezing   famotidine (PEPCID) 20 mg tablet   No No   Sig: Take 1 tablet (20 mg total) by mouth 2 (two) times a day   Patient not taking: Reported on 1/9/2024   loratadine (CLARITIN) 10 mg tablet   No No   Sig: Take 1 tablet (10 mg total) by mouth daily   Patient not taking: Reported on 8/30/2022   methocarbamol (ROBAXIN) 500 mg tablet   No No   Sig: Take 1 tablet (500 mg total) by mouth 2 (two) times a day   naproxen (NAPROSYN) 500 mg tablet   No No   Sig: Take 1 tablet (500 mg total) by mouth 2 (two) times a day with meals   Patient not taking: Reported on 1/9/2024   naproxen (Naprosyn) 500 mg tablet   No No   Sig: Take 1 tablet (500 mg total) by mouth 2 (two) times a day with meals   omeprazole (PriLOSEC) 20 mg delayed release capsule   No No   Sig: Take 1 capsule (20 mg total) by mouth daily   Patient not taking: Reported on  8/30/2022   ondansetron (ZOFRAN) 4 mg tablet   No No   Sig: Take 1 tablet (4 mg total) by mouth every 8 (eight) hours as needed for nausea   Patient not taking: Reported on 8/30/2022   ondansetron (ZOFRAN-ODT) 4 mg disintegrating tablet   No No   Sig: Take 1 tablet (4 mg total) by mouth every 6 (six) hours as needed for nausea   Patient not taking: Reported on 1/9/2024   sucralfate (CARAFATE) 1 g tablet   No No   Sig: Take 1 tablet (1 g total) by mouth 4 (four) times a day   Patient not taking: Reported on 1/9/2024      Facility-Administered Medications: None       Past Medical History:   Diagnosis Date    Asthma     No known problems        History reviewed. No pertinent surgical history.    History reviewed. No pertinent family history.  I have reviewed and agree with the history as documented.    E-Cigarette/Vaping    E-Cigarette Use Never User      E-Cigarette/Vaping Substances    Nicotine No     THC No     CBD No     Flavoring No     Other No     Unknown No      Social History     Tobacco Use    Smoking status: Never    Smokeless tobacco: Never   Vaping Use    Vaping status: Never Used   Substance Use Topics    Alcohol use: Never    Drug use: Never       Review of Systems   Constitutional:  Negative for fever.   Respiratory: Negative.     Cardiovascular: Negative.    Gastrointestinal: Negative.    Skin:  Positive for wound.   All other systems reviewed and are negative.      Physical Exam  Physical Exam  Vitals and nursing note reviewed. Exam conducted with a chaperone present.   Constitutional:       Appearance: She is well-developed.   HENT:      Head: Normocephalic and atraumatic.      Right Ear: Tympanic membrane and external ear normal.      Left Ear: Tympanic membrane and external ear normal.   Eyes:      Conjunctiva/sclera: Conjunctivae normal.   Cardiovascular:      Rate and Rhythm: Normal rate and regular rhythm.      Heart sounds: Normal heart sounds.   Pulmonary:      Effort: Pulmonary effort is  normal.      Breath sounds: Normal breath sounds.   Abdominal:      General: Bowel sounds are normal.      Palpations: Abdomen is soft.   Genitourinary:     Vagina: No vaginal discharge or tenderness.      Cervix: Cervical bleeding present. No cervical motion tenderness.      Uterus: Normal.       Adnexa: Right adnexa normal and left adnexa normal.      Rectum: Normal.          Comments: Some bleeding from cervical oz - pt on menstrual cycle now.  No other discharge.   Musculoskeletal:         General: Normal range of motion.      Cervical back: Neck supple.   Lymphadenopathy:      Cervical: No cervical adenopathy.   Skin:     General: Skin is warm.      Findings: No rash.   Neurological:      Mental Status: She is alert.   Psychiatric:         Behavior: Behavior normal.         Vital Signs  ED Triage Vitals [07/07/24 0949]   Temperature Pulse Respirations Blood Pressure SpO2   98 °F (36.7 °C) 69 16 118/77 98 %      Temp Source Heart Rate Source Patient Position - Orthostatic VS BP Location FiO2 (%)   Oral Monitor -- -- --      Pain Score       --           Vitals:    07/07/24 0949   BP: 118/77   Pulse: 69         Visual Acuity      ED Medications  Medications - No data to display    Diagnostic Studies  Results Reviewed       Procedure Component Value Units Date/Time    RPR-Syphilis Screening (Total Syphilis IGG/IGM) [[id number]] Collected: 07/07/24 1112    Lab Status: In process Specimen: Blood from Arm, Left Updated: 07/07/24 1114    Chlamydia/GC amplified DNA by PCR [[id number]] Collected: 07/07/24 1040    Lab Status: In process Specimen: Endocervical Updated: 07/07/24 1052    HSV TYPE 1,2 DNA PCR [facility] SWAB ONLY [[id number]] Collected: 07/07/24 1040    Lab Status: In process Specimen: Swab from Vulva Updated: 07/07/24 1051    Molecular Vaginal Panel [[id number]] Collected: 07/07/24 1040    Lab Status: In process Specimen: Genital from Vaginal Updated: 07/07/24 1051    Urine Microscopic [[id number]]  (Abnormal)  Collected: 07/07/24 1013    Lab Status: Final result Specimen: Urine, Clean Catch Updated: 07/07/24 1036     RBC, UA 1-2 /hpf      WBC, UA 1-2 /hpf      Epithelial Cells Occasional /hpf      Bacteria, UA Occasional /hpf      MUCUS THREADS Occasional    POCT pregnancy, urine [[id number]]  (Normal) Resulted: 07/07/24 1015    Lab Status: Final result Updated: 07/07/24 1015     EXT Preg Test, Ur Negative     Control Valid    Urine Macroscopic, POC [[id number]]  (Abnormal) Collected: 07/07/24 1013    Lab Status: Final result Specimen: Urine Updated: 07/07/24 1015     Color, UA Yellow     Clarity, UA Clear     pH, UA 6.0     Leukocytes, UA Negative     Nitrite, UA Negative     Protein, UA Negative mg/dl      Glucose, UA Negative mg/dl      Ketones, UA Negative mg/dl      Urobilinogen, UA 0.2 E.U./dl      Bilirubin, UA Negative     Occult Blood, UA Trace     Specific Gravity, UA >=1.030    Narrative:      CLINITEK RESULT                   No orders to display              Procedures  Procedures         ED Course                               SBIRT 22yo+      Flowsheet Row Most Recent Value   Initial Alcohol Screen: US AUDIT-C     1. How often do you have a drink containing alcohol? 0 Filed at: 07/07/2024 1052   2. How many drinks containing alcohol do you have on a typical day you are drinking?  0 Filed at: 07/07/2024 1052   3a. Male UNDER 65: How often do you have five or more drinks on one occasion? 0 Filed at: 07/07/2024 1052   3b. FEMALE Any Age, or MALE 65+: How often do you have 4 or more drinks on one occassion? 0 Filed at: 07/07/2024 1052   Audit-C Score 0 Filed at: 07/07/2024 1052   PRITI: How many times in the past year have you...    Used an illegal drug or used a prescription medication for non-medical reasons? Never Filed at: 07/07/2024 1052                      Medical Decision Making  Will test and treat for herpes.  The ulcer is painless concern for syphilis will test for this as well.  Discussed close  follow-up with her gynecologist.  Discussed could be irritation from using the nare as well.  However will test and treat for herpes and test for syphilis no other symptoms or signs of any other STDs however will test for gonorrhea chlamydia and for BV/trichomonas.  Discussed with patient.    Amount and/or Complexity of Data Reviewed  Labs: ordered.  Discussion of management or test interpretation with external provider(s): DR Channing Sykes  Prescription drug management.             Disposition  Final diagnoses:   Genital labial ulcer     Time reflects when diagnosis was documented in both MDM as applicable and the Disposition within this note       Time User Action Codes Description Comment    7/7/2024 10:53 AM Joselin Mtz Add [N76.6] Genital labial ulcer           ED Disposition       ED Disposition   Discharge    Condition   Stable    Date/Time   Sun Jul 7, 2024 1052    Comment   Dahlia Iverson discharge to home/self care.                   Follow-up Information       Follow up With Specialties Details Why Contact Info Additional Information    [facility]   [address] [facility] [address]  [phone]       [facility]   [address] [place] [phone] [facility], [address] [place] [address], [phone]    [facility]   [address] [place] [phone] [facility], [address] [place] [address], [place], [place], [phone]            Discharge Medication List as of 7/7/2024 10:56 AM        START taking these medications    Details   acyclovir (ZOVIRAX) 400 MG tablet Take 1 tablet (400 mg total) by mouth 3 (three) times a day for 7 days, Starting Sun 7/7/2024, Until Sun 7/14/2024, Normal            CONTINUE these medications which have NOT CHANGED    Details   albuterol (5 mg/mL) 0.5 % nebulizer solution Take 0.5 mL (2.5 mg total) by nebulization every 6 (six) hours as needed for wheezing, Starting Tue 11/30/2021, Normal      !! albuterol (Proventil HFA) 90 mcg/act inhaler Inhale 1-2 puffs every 6 (six) hours as needed for wheezing, Starting Tue 1/9/2024, Normal      !! albuterol (PROVENTIL HFA,VENTOLIN HFA) 90 mcg/act inhaler Inhale 2 puffs every 4 (four) hours as needed for wheezing, Starting Tue 11/30/2021, Normal      EPINEPHrine (EPIPEN) 0.3 mg/0.3 mL SOAJ Inject 0.3 mL (0.3 mg total) into a muscle once for 1 dose, Starting Wed 11/9/2022, Normal      famotidine (PEPCID) 20 mg tablet Take 1 tablet (20 mg total) by mouth 2 (two) times a day, Starting Sun 8/27/2023, Normal      loratadine (CLARITIN) 10 mg tablet Take 1 tablet (10 mg total) by mouth daily, Starting Tue 10/26/2021, Normal      methocarbamol (ROBAXIN) 500 mg tablet Take 1 tablet (500 mg total) by mouth 2 (two) times a day, Starting Mon 5/20/2024, Normal      !! naproxen (NAPROSYN) 500 mg tablet Take 1 tablet (500 mg total) by mouth 2 (two) times a day with meals, Starting Tue 2/14/2023, Normal      !! naproxen (Naprosyn) 500 mg tablet Take 1 tablet (500 mg total) by mouth 2 (two) times a day with meals, Starting Mon 5/20/2024, Normal      omeprazole (PriLOSEC) 20 mg delayed release capsule Take 1 capsule (20 mg total) by mouth daily, Starting Wed 8/10/2022, Normal      ondansetron (ZOFRAN) 4 mg tablet Take 1 tablet (4 mg total) by mouth every 8 (eight) hours as needed for nausea, Starting Wed 8/10/2022, Normal      ondansetron (ZOFRAN-ODT) 4 mg disintegrating tablet Take 1 tablet (4 mg total) by mouth every 6 (six) hours as needed for nausea, Starting Sun 8/27/2023, Normal      sucralfate (CARAFATE) 1 g tablet Take 1 tablet (1 g total) by mouth 4 (four) times a day, Starting Sun 8/27/2023, Normal       !! - Potential duplicate medications  found. Please discuss with provider.          No discharge procedures on file.    PDMP Review       None            ED Provider  Electronically Signed by             Joselin Mtz PA-C  07/07/24 6311

## 2024-07-07 NOTE — DISCHARGE INSTRUCTIONS
Take the acylcovir as directed. Follow up with your GYN. No sexual relations until test results are back.

## 2024-07-08 LAB
C GLABRATA DNA VAG QL NAA+PROBE: NEGATIVE
C KRUSEI DNA VAG QL NAA+PROBE: NEGATIVE
C TRACH DNA SPEC QL NAA+PROBE: NEGATIVE
CANDIDA SP 6 PNL VAG NAA+PROBE: NEGATIVE
HSV1 DNA SPEC QL NAA+PROBE: NOT DETECTED
HSV1 DNA SPEC QL NAA+PROBE: NOT DETECTED
N GONORRHOEA DNA SPEC QL NAA+PROBE: NEGATIVE
T VAGINALIS DNA VAG QL NAA+PROBE: NEGATIVE
TREPONEMA PALLIDUM IGG+IGM AB [PRESENCE] IN SERUM OR PLASMA BY IMMUNOASSAY: NORMAL
VAGINOSIS/ITIS DNA PNL VAG PROBE+SIG AMP: POSITIVE

## 2024-07-10 RX ORDER — METRONIDAZOLE 500 MG/1
500 TABLET ORAL EVERY 12 HOURS SCHEDULED
Qty: 14 TABLET | Refills: 0 | Status: SHIPPED | OUTPATIENT
Start: 2024-07-10 | End: 2024-07-17